# Patient Record
Sex: FEMALE | Race: OTHER | HISPANIC OR LATINO
[De-identification: names, ages, dates, MRNs, and addresses within clinical notes are randomized per-mention and may not be internally consistent; named-entity substitution may affect disease eponyms.]

---

## 2017-08-01 ENCOUNTER — TRANSCRIPTION ENCOUNTER (OUTPATIENT)
Age: 27
End: 2017-08-01

## 2020-11-27 ENCOUNTER — APPOINTMENT (OUTPATIENT)
Dept: OBGYN | Facility: HOSPITAL | Age: 30
End: 2020-11-27

## 2020-11-27 ENCOUNTER — OUTPATIENT (OUTPATIENT)
Dept: OUTPATIENT SERVICES | Facility: HOSPITAL | Age: 30
LOS: 1 days | End: 2020-11-27

## 2020-11-27 ENCOUNTER — TRANSCRIPTION ENCOUNTER (OUTPATIENT)
Age: 30
End: 2020-11-27

## 2020-11-27 ENCOUNTER — EMERGENCY (EMERGENCY)
Facility: HOSPITAL | Age: 30
LOS: 1 days | Discharge: HOME | End: 2020-11-27
Attending: EMERGENCY MEDICINE | Admitting: EMERGENCY MEDICINE
Payer: MEDICAID

## 2020-11-27 VITALS
RESPIRATION RATE: 18 BRPM | SYSTOLIC BLOOD PRESSURE: 105 MMHG | HEART RATE: 77 BPM | OXYGEN SATURATION: 100 % | DIASTOLIC BLOOD PRESSURE: 55 MMHG

## 2020-11-27 VITALS
RESPIRATION RATE: 18 BRPM | HEART RATE: 75 BPM | DIASTOLIC BLOOD PRESSURE: 64 MMHG | TEMPERATURE: 98 F | SYSTOLIC BLOOD PRESSURE: 104 MMHG | OXYGEN SATURATION: 98 %

## 2020-11-27 DIAGNOSIS — Z32.00 ENCOUNTER FOR PREGNANCY TEST, RESULT UNKNOWN: ICD-10-CM

## 2020-11-27 PROBLEM — Z00.00 ENCOUNTER FOR PREVENTIVE HEALTH EXAMINATION: Status: ACTIVE | Noted: 2020-11-27

## 2020-11-27 LAB
ALBUMIN SERPL ELPH-MCNC: 4.5 G/DL — SIGNIFICANT CHANGE UP (ref 3.3–5)
ALP SERPL-CCNC: 62 U/L — SIGNIFICANT CHANGE UP (ref 40–120)
ALT FLD-CCNC: 42 U/L — HIGH (ref 4–33)
ANION GAP SERPL CALC-SCNC: 8 MMO/L — SIGNIFICANT CHANGE UP (ref 7–14)
APPEARANCE UR: SIGNIFICANT CHANGE UP
AST SERPL-CCNC: 19 U/L — SIGNIFICANT CHANGE UP (ref 4–32)
BACTERIA # UR AUTO: SIGNIFICANT CHANGE UP
BASOPHILS # BLD AUTO: 0.01 K/UL — SIGNIFICANT CHANGE UP (ref 0–0.2)
BASOPHILS NFR BLD AUTO: 0.2 % — SIGNIFICANT CHANGE UP (ref 0–2)
BILIRUB SERPL-MCNC: 1.1 MG/DL — SIGNIFICANT CHANGE UP (ref 0.2–1.2)
BILIRUB UR-MCNC: NEGATIVE — SIGNIFICANT CHANGE UP
BLD GP AB SCN SERPL QL: NEGATIVE — SIGNIFICANT CHANGE UP
BLOOD UR QL VISUAL: NEGATIVE — SIGNIFICANT CHANGE UP
BUN SERPL-MCNC: 12 MG/DL — SIGNIFICANT CHANGE UP (ref 7–23)
CALCIUM SERPL-MCNC: 8.9 MG/DL — SIGNIFICANT CHANGE UP (ref 8.4–10.5)
CHLORIDE SERPL-SCNC: 103 MMOL/L — SIGNIFICANT CHANGE UP (ref 98–107)
CO2 SERPL-SCNC: 23 MMOL/L — SIGNIFICANT CHANGE UP (ref 22–31)
COLOR SPEC: YELLOW — SIGNIFICANT CHANGE UP
CREAT SERPL-MCNC: 0.43 MG/DL — LOW (ref 0.5–1.3)
EOSINOPHIL # BLD AUTO: 0.03 K/UL — SIGNIFICANT CHANGE UP (ref 0–0.5)
EOSINOPHIL NFR BLD AUTO: 0.6 % — SIGNIFICANT CHANGE UP (ref 0–6)
GLUCOSE SERPL-MCNC: 84 MG/DL — SIGNIFICANT CHANGE UP (ref 70–99)
GLUCOSE UR-MCNC: NEGATIVE — SIGNIFICANT CHANGE UP
HCG SERPL-ACNC: 8089 MIU/ML — SIGNIFICANT CHANGE UP
HCG UR QL: POSITIVE
HCG UR-SCNC: POSITIVE — SIGNIFICANT CHANGE UP
HCT VFR BLD CALC: 31 % — LOW (ref 34.5–45)
HGB BLD-MCNC: 10.1 G/DL — LOW (ref 11.5–15.5)
HYALINE CASTS # UR AUTO: SIGNIFICANT CHANGE UP
IMM GRANULOCYTES NFR BLD AUTO: 0.4 % — SIGNIFICANT CHANGE UP (ref 0–1.5)
INR BLD: 1.25 — HIGH (ref 0.88–1.16)
KETONES UR-MCNC: NEGATIVE — SIGNIFICANT CHANGE UP
LEUKOCYTE ESTERASE UR-ACNC: SIGNIFICANT CHANGE UP
LYMPHOCYTES # BLD AUTO: 1.39 K/UL — SIGNIFICANT CHANGE UP (ref 1–3.3)
LYMPHOCYTES # BLD AUTO: 27.4 % — SIGNIFICANT CHANGE UP (ref 13–44)
MCHC RBC-ENTMCNC: 29.5 PG — SIGNIFICANT CHANGE UP (ref 27–34)
MCHC RBC-ENTMCNC: 32.6 % — SIGNIFICANT CHANGE UP (ref 32–36)
MCV RBC AUTO: 90.6 FL — SIGNIFICANT CHANGE UP (ref 80–100)
MONOCYTES # BLD AUTO: 0.38 K/UL — SIGNIFICANT CHANGE UP (ref 0–0.9)
MONOCYTES NFR BLD AUTO: 7.5 % — SIGNIFICANT CHANGE UP (ref 2–14)
NEUTROPHILS # BLD AUTO: 3.25 K/UL — SIGNIFICANT CHANGE UP (ref 1.8–7.4)
NEUTROPHILS NFR BLD AUTO: 63.9 % — SIGNIFICANT CHANGE UP (ref 43–77)
NITRITE UR-MCNC: NEGATIVE — SIGNIFICANT CHANGE UP
NRBC # FLD: 0 K/UL — SIGNIFICANT CHANGE UP (ref 0–0)
PH UR: 8 — SIGNIFICANT CHANGE UP (ref 5–8)
PLATELET # BLD AUTO: 194 K/UL — SIGNIFICANT CHANGE UP (ref 150–400)
PMV BLD: 10.2 FL — SIGNIFICANT CHANGE UP (ref 7–13)
POTASSIUM SERPL-MCNC: 3.9 MMOL/L — SIGNIFICANT CHANGE UP (ref 3.5–5.3)
POTASSIUM SERPL-SCNC: 3.9 MMOL/L — SIGNIFICANT CHANGE UP (ref 3.5–5.3)
PROT SERPL-MCNC: 7.1 G/DL — SIGNIFICANT CHANGE UP (ref 6–8.3)
PROT UR-MCNC: 20 — SIGNIFICANT CHANGE UP
PROTHROM AB SERPL-ACNC: 14.2 SEC — HIGH (ref 10.6–13.6)
QUALITY CONTROL: YES
RBC # BLD: 3.42 M/UL — LOW (ref 3.8–5.2)
RBC # FLD: 12.3 % — SIGNIFICANT CHANGE UP (ref 10.3–14.5)
RBC CASTS # UR COMP ASSIST: SIGNIFICANT CHANGE UP (ref 0–?)
RH IG SCN BLD-IMP: POSITIVE — SIGNIFICANT CHANGE UP
SODIUM SERPL-SCNC: 134 MMOL/L — LOW (ref 135–145)
SP GR SPEC: 1.03 — SIGNIFICANT CHANGE UP (ref 1–1.04)
SP GR UR: SIGNIFICANT CHANGE UP (ref 1–1.04)
SQUAMOUS # UR AUTO: SIGNIFICANT CHANGE UP
UROBILINOGEN FLD QL: NORMAL — SIGNIFICANT CHANGE UP
WBC # BLD: 5.08 K/UL — SIGNIFICANT CHANGE UP (ref 3.8–10.5)
WBC # FLD AUTO: 5.08 K/UL — SIGNIFICANT CHANGE UP (ref 3.8–10.5)
WBC UR QL: HIGH (ref 0–?)

## 2020-11-27 PROCEDURE — 76830 TRANSVAGINAL US NON-OB: CPT | Mod: 26

## 2020-11-27 PROCEDURE — 99284 EMERGENCY DEPT VISIT MOD MDM: CPT

## 2020-11-27 RX ORDER — CEPHALEXIN 500 MG
1 CAPSULE ORAL
Qty: 13 | Refills: 0
Start: 2020-11-27 | End: 2020-12-03

## 2020-11-27 RX ORDER — CEPHALEXIN 500 MG
500 CAPSULE ORAL ONCE
Refills: 0 | Status: COMPLETED | OUTPATIENT
Start: 2020-11-27 | End: 2020-11-27

## 2020-11-27 RX ADMIN — Medication 500 MILLIGRAM(S): at 16:17

## 2020-11-27 NOTE — ED PROVIDER NOTE - OBJECTIVE STATEMENT
31 y/o F with no significant PMHx currently pregnant based on home pregnancy test presents to the ED c/o 3 days pelvic cramping and spotting. Went to GYN clinic and tested positive on a pregnancy test and was advised to come to the ED for a further evaluation. Pt states she tested positive for COVID on  and tested negative for COVID on . Pt currently denies any COVID symptoms. LMP 10/17 and pt is . Denies fever, chills, chest pain, SOB, cough, weakness, dizziness, nausea, vomiting, dysuria, urine frequency. 29 y/o Female with no significant PMHx currently pregnant based on home pregnancy test presents to the ER c/o 3 days of pelvic cramping and spotting. Pt went to the GYN clinic at [facility] and had a positive pregnancy test and was advised to go to the ED for a further evaluation. Pt states she tested positive for COVID on  and tested negative for COVID on . Pt currently denies any COVID symptoms. LMP 10/17 and pt is . Denies fever, chills, chest pain, SOB, cough, weakness, dizziness, nausea, vomiting, dysuria, urine frequency.

## 2020-11-27 NOTE — ED PROVIDER NOTE - ATTENDING CONTRIBUTION TO CARE
I performed a face to face evaluation of this patient and obtained a history and performed a full exam.  I agree with the history, physical exam and plan of the PA.    Brief HPI:  31 yo  F, LMP 10/17 presents for vaginal bleeding.  No confirmed iup for this pregnancy.  Reports cramping lower abdominal pain.     Vitals:   Reviewed    Exam:    GEN:  Non-toxic appearing, non-distressed, speaking full sentences, non-diaphoretic, AAOx3  HEENT:  NCAT, neck supple, EOMI, PERRLA, sclera anicteric, no conjunctival pallor or injection, no stridor, normal voice, no tonsillar exudate, uvula midline, tympanic membranes and external auditory canals normal appearing bilaterally   CV:  regular rhythm and rate, s1/s2 audible, no murmurs, rubs or gallops, peripheral pulses 2+ and symmetric  PULM:  non-labored respirations, lungs clear to auscultation bilaterally, no wheezes, crackles or rales  ABD:  non distended, non-tender, no rebound, no guarding, negative Jolley's sign, bowel sounds normal, no cvat  MSK:  no gross deformity, non-tender extremities and joints, range of motion grossly normal appearing, no extremity edema, extremities warm and well perfused   NEURO:  AAOx3, CN II-XII intact, motor 5/5 in upper and lower extremities bilaterally, sensation grossly intact in extremities and trunk, finger to nose testing wnl, no nystagmus, negative Romberg, no pronator drift, no gait deficit  SKIN:  warm, dry, no rash or vesicles   GYN:  See PA note     A/P:  31 yo  F, LMP 10/17 presents for vaginal bleeding and lower abdominal pain.  VSS.  DDx includes ectopic vs. threatened vs. spontaneous .  Labs, tvus, supportive care.  Dispo pending.

## 2020-11-27 NOTE — ED PROVIDER NOTE - PROGRESS NOTE DETAILS
YEE Pritchard: pt feels better ambulating without difficulty.  Results reviewed with patient.  Discharge reviewed and discussed with patient.

## 2020-11-27 NOTE — ED ADULT NURSE NOTE - OBJECTIVE STATEMENT
Pt awake, alert and oriented x 4 c/o vaginal bleeding x 3 days with positive UCG at home today.   LMP 10/17.   Pt tested positive for covid on 11/5 started on abx and tested negative 11/17.   Denies pelvic/abdominal pain or pain/burning with urination.  No bleeding from elsewhere.   IV placed and blood work sent.

## 2020-11-27 NOTE — ED PROVIDER NOTE - PATIENT PORTAL LINK FT
You can access the FollowMyHealth Patient Portal offered by [facility] by registering at the following website: [email or web address]. By joining [employer]’s FollowMyHealth portal, you will also be able to view your health information using other applications (apps) compatible with our system.

## 2020-11-27 NOTE — ED PROVIDER NOTE - NSFOLLOWUPINSTRUCTIONS_ED_ALL_ED_FT
Follow up with your Primary Medical Doctor in 1-2 days.  Follow up with OB/GYN in 1-2 days see attached list.  Rest,  Drink plenty of fluids.  Take Keflex 500mg orally 2 x a day x 7 days.  Return to the ER for any persistent/worsening or new symptoms pain, increased bleeding, weakness, dizziness, or any concerning symptoms.

## 2020-11-27 NOTE — ED PROVIDER NOTE - CLINICAL SUMMARY MEDICAL DECISION MAKING FREE TEXT BOX
31 y/o F with no significant PMHx currently pregnant based on home pregnancy test presents to the ED c/o 3 days pelvic cramping and spotting. Pt is well appearing and in no acute distress. Will check labs, US to further eval pregnancy and pt to f/u OBGYN. 29 y/o Female with no significant PMHx currently pregnant based on home pregnancy test presents to the ER c/o 3 days pelvic cramping and spotting. Pt is well appearing and in no acute distress. Will check labs, US to further eval pregnancy and f/u OBGYN.

## 2020-11-28 LAB
CULTURE RESULTS: SIGNIFICANT CHANGE UP
SPECIMEN SOURCE: SIGNIFICANT CHANGE UP

## 2020-11-30 DIAGNOSIS — Z32.00 ENCOUNTER FOR PREGNANCY TEST, RESULT UNKNOWN: ICD-10-CM

## 2020-12-22 ENCOUNTER — RESULT REVIEW (OUTPATIENT)
Age: 30
End: 2020-12-22

## 2020-12-22 ENCOUNTER — NON-APPOINTMENT (OUTPATIENT)
Age: 30
End: 2020-12-22

## 2020-12-22 ENCOUNTER — OUTPATIENT (OUTPATIENT)
Dept: OUTPATIENT SERVICES | Facility: HOSPITAL | Age: 30
LOS: 1 days | End: 2020-12-22

## 2020-12-22 ENCOUNTER — APPOINTMENT (OUTPATIENT)
Dept: OBGYN | Facility: HOSPITAL | Age: 30
End: 2020-12-22

## 2020-12-22 VITALS
HEIGHT: 67 IN | TEMPERATURE: 97 F | WEIGHT: 164 LBS | SYSTOLIC BLOOD PRESSURE: 110 MMHG | DIASTOLIC BLOOD PRESSURE: 50 MMHG | BODY MASS INDEX: 25.74 KG/M2 | HEART RATE: 76 BPM

## 2020-12-22 DIAGNOSIS — Z00.00 ENCOUNTER FOR GENERAL ADULT MEDICAL EXAMINATION WITHOUT ABNORMAL FINDINGS: ICD-10-CM

## 2020-12-22 DIAGNOSIS — O99.611 DISEASES OF THE DIGESTIVE SYSTEM COMPLICATING PREGNANCY, FIRST TRIMESTER: ICD-10-CM

## 2020-12-22 DIAGNOSIS — Z34.91 ENCOUNTER FOR SUPERVISION OF NORMAL PREGNANCY, UNSPECIFIED, FIRST TRIMESTER: ICD-10-CM

## 2020-12-22 DIAGNOSIS — Z34.90 ENCOUNTER FOR SUPERVISION OF NORMAL PREGNANCY, UNSPECIFIED, UNSPECIFIED TRIMESTER: ICD-10-CM

## 2020-12-22 DIAGNOSIS — Z80.0 FAMILY HISTORY OF MALIGNANT NEOPLASM OF DIGESTIVE ORGANS: ICD-10-CM

## 2020-12-22 DIAGNOSIS — Z23 ENCOUNTER FOR IMMUNIZATION: ICD-10-CM

## 2020-12-22 PROBLEM — Z78.9 OTHER SPECIFIED HEALTH STATUS: Chronic | Status: ACTIVE | Noted: 2020-12-02

## 2020-12-22 LAB
BASOPHILS # BLD AUTO: 0.02 K/UL — SIGNIFICANT CHANGE UP (ref 0–0.2)
BASOPHILS NFR BLD AUTO: 0.4 % — SIGNIFICANT CHANGE UP (ref 0–2)
EOSINOPHIL # BLD AUTO: 0.06 K/UL — SIGNIFICANT CHANGE UP (ref 0–0.5)
EOSINOPHIL NFR BLD AUTO: 1.2 % — SIGNIFICANT CHANGE UP (ref 0–6)
HCT VFR BLD CALC: 35.6 % — SIGNIFICANT CHANGE UP (ref 34.5–45)
HGB BLD-MCNC: 11.8 G/DL — SIGNIFICANT CHANGE UP (ref 11.5–15.5)
IANC: 3.4 K/UL — SIGNIFICANT CHANGE UP (ref 1.5–8.5)
IMM GRANULOCYTES NFR BLD AUTO: 0.2 % — SIGNIFICANT CHANGE UP (ref 0–1.5)
LYMPHOCYTES # BLD AUTO: 1.13 K/UL — SIGNIFICANT CHANGE UP (ref 1–3.3)
LYMPHOCYTES # BLD AUTO: 23.3 % — SIGNIFICANT CHANGE UP (ref 13–44)
MCHC RBC-ENTMCNC: 31.2 PG — SIGNIFICANT CHANGE UP (ref 27–34)
MCHC RBC-ENTMCNC: 33.1 GM/DL — SIGNIFICANT CHANGE UP (ref 32–36)
MCV RBC AUTO: 94.2 FL — SIGNIFICANT CHANGE UP (ref 80–100)
MONOCYTES # BLD AUTO: 0.23 K/UL — SIGNIFICANT CHANGE UP (ref 0–0.9)
MONOCYTES NFR BLD AUTO: 4.7 % — SIGNIFICANT CHANGE UP (ref 2–14)
NEUTROPHILS # BLD AUTO: 3.4 K/UL — SIGNIFICANT CHANGE UP (ref 1.8–7.4)
NEUTROPHILS NFR BLD AUTO: 70.2 % — SIGNIFICANT CHANGE UP (ref 43–77)
NRBC # BLD: 0 /100 WBCS — SIGNIFICANT CHANGE UP
NRBC # FLD: 0 K/UL — SIGNIFICANT CHANGE UP
PLATELET # BLD AUTO: 193 K/UL — SIGNIFICANT CHANGE UP (ref 150–400)
RBC # BLD: 3.78 M/UL — LOW (ref 3.8–5.2)
RBC # FLD: 13.7 % — SIGNIFICANT CHANGE UP (ref 10.3–14.5)
WBC # BLD: 4.85 K/UL — SIGNIFICANT CHANGE UP (ref 3.8–10.5)
WBC # FLD AUTO: 4.85 K/UL — SIGNIFICANT CHANGE UP (ref 3.8–10.5)

## 2020-12-23 LAB
24R-OH-CALCIDIOL SERPL-MCNC: 14.6 NG/ML — LOW (ref 30–80)
A1C WITH ESTIMATED AVERAGE GLUCOSE RESULT: 4.5 % — SIGNIFICANT CHANGE UP (ref 4–5.6)
ALBUMIN SERPL ELPH-MCNC: 4.6 G/DL — SIGNIFICANT CHANGE UP (ref 3.3–5)
ALP SERPL-CCNC: 57 U/L — SIGNIFICANT CHANGE UP (ref 40–120)
ALT FLD-CCNC: 14 U/L — SIGNIFICANT CHANGE UP (ref 4–33)
ANION GAP SERPL CALC-SCNC: 13 MMOL/L — SIGNIFICANT CHANGE UP (ref 7–14)
APPEARANCE UR: ABNORMAL
AST SERPL-CCNC: 13 U/L — SIGNIFICANT CHANGE UP (ref 4–32)
BACTERIA # UR AUTO: NEGATIVE — SIGNIFICANT CHANGE UP
BILIRUB SERPL-MCNC: 1.2 MG/DL — SIGNIFICANT CHANGE UP (ref 0.2–1.2)
BILIRUB UR-MCNC: NEGATIVE — SIGNIFICANT CHANGE UP
BUN SERPL-MCNC: 10 MG/DL — SIGNIFICANT CHANGE UP (ref 7–23)
CALCIUM SERPL-MCNC: 9.3 MG/DL — SIGNIFICANT CHANGE UP (ref 8.4–10.5)
CHLORIDE SERPL-SCNC: 101 MMOL/L — SIGNIFICANT CHANGE UP (ref 98–107)
CO2 SERPL-SCNC: 23 MMOL/L — SIGNIFICANT CHANGE UP (ref 22–31)
COLOR SPEC: YELLOW — SIGNIFICANT CHANGE UP
CREAT SERPL-MCNC: 0.43 MG/DL — LOW (ref 0.5–1.3)
CULTURE RESULTS: SIGNIFICANT CHANGE UP
DIFF PNL FLD: ABNORMAL
EPI CELLS # UR: SIGNIFICANT CHANGE UP
ESTIMATED AVERAGE GLUCOSE: 82 MG/DL — SIGNIFICANT CHANGE UP (ref 68–114)
GLUCOSE SERPL-MCNC: 37 MG/DL — CRITICAL LOW (ref 70–99)
GLUCOSE UR QL: NEGATIVE — SIGNIFICANT CHANGE UP
HBV SURFACE AG SER-ACNC: SIGNIFICANT CHANGE UP
HCV AB S/CO SERPL IA: 0.12 S/CO — SIGNIFICANT CHANGE UP (ref 0–0.99)
HCV AB SERPL-IMP: SIGNIFICANT CHANGE UP
HEMOGLOBIN INTERPRETATION: SIGNIFICANT CHANGE UP
HGB A MFR BLD: 96.4 % — SIGNIFICANT CHANGE UP
HGB A2 MFR BLD: 2.9 % — SIGNIFICANT CHANGE UP (ref 2.4–3.5)
HGB F MFR BLD: <1 % — SIGNIFICANT CHANGE UP (ref 0–1.5)
HIV 1+2 AB+HIV1 P24 AG SERPL QL IA: SIGNIFICANT CHANGE UP
HYALINE CASTS # UR AUTO: SIGNIFICANT CHANGE UP /LPF (ref 0–7)
KETONES UR-MCNC: NEGATIVE — SIGNIFICANT CHANGE UP
LEUKOCYTE ESTERASE UR-ACNC: NEGATIVE — SIGNIFICANT CHANGE UP
MEV IGG SER-ACNC: 17 AU/ML — SIGNIFICANT CHANGE UP
MEV IGG+IGM SER-IMP: POSITIVE — SIGNIFICANT CHANGE UP
NITRITE UR-MCNC: NEGATIVE — SIGNIFICANT CHANGE UP
PH UR: 7 — SIGNIFICANT CHANGE UP (ref 5–8)
POTASSIUM SERPL-MCNC: 3.1 MMOL/L — LOW (ref 3.5–5.3)
POTASSIUM SERPL-SCNC: 3.1 MMOL/L — LOW (ref 3.5–5.3)
PROT SERPL-MCNC: 7.8 G/DL — SIGNIFICANT CHANGE UP (ref 6–8.3)
PROT UR-MCNC: ABNORMAL
RBC CASTS # UR COMP ASSIST: SIGNIFICANT CHANGE UP /HPF (ref 0–4)
RUBV IGG SER-ACNC: 2.5 INDEX — SIGNIFICANT CHANGE UP
RUBV IGG SER-IMP: POSITIVE — SIGNIFICANT CHANGE UP
SODIUM SERPL-SCNC: 137 MMOL/L — SIGNIFICANT CHANGE UP (ref 135–145)
SP GR SPEC: 1.03 — HIGH (ref 1.01–1.02)
SPECIMEN SOURCE: SIGNIFICANT CHANGE UP
T PALLIDUM AB TITR SER: NEGATIVE — SIGNIFICANT CHANGE UP
URATE SERPL-MCNC: 1.6 MG/DL — LOW (ref 2.5–7)
UROBILINOGEN FLD QL: ABNORMAL
VZV IGG FLD QL IA: 2665 INDEX — SIGNIFICANT CHANGE UP
VZV IGG FLD QL IA: POSITIVE — SIGNIFICANT CHANGE UP
WBC UR QL: SIGNIFICANT CHANGE UP /HPF (ref 0–5)

## 2020-12-24 LAB
CYTOLOGY SPEC DOC CYTO: SIGNIFICANT CHANGE UP
GAMMA INTERFERON BACKGROUND BLD IA-ACNC: 0.06 IU/ML — SIGNIFICANT CHANGE UP
LEAD BLD-MCNC: <1 UG/DL — SIGNIFICANT CHANGE UP (ref 0–4)
M TB IFN-G BLD-IMP: NEGATIVE — SIGNIFICANT CHANGE UP
M TB IFN-G CD4+ BCKGRND COR BLD-ACNC: 0.04 IU/ML — SIGNIFICANT CHANGE UP
M TB IFN-G CD4+CD8+ BCKGRND COR BLD-ACNC: 0.15 IU/ML — SIGNIFICANT CHANGE UP
QUANT TB PLUS MITOGEN MINUS NIL: >10 IU/ML — SIGNIFICANT CHANGE UP
SARS-COV-2 IGG SERPL QL IA: POSITIVE
SARS-COV-2 IGM SERPL IA-ACNC: 17 INDEX — HIGH

## 2020-12-28 ENCOUNTER — NON-APPOINTMENT (OUTPATIENT)
Age: 30
End: 2020-12-28

## 2021-01-03 LAB — CYSTIC FIBROSIS EXPANDED PANEL: SIGNIFICANT CHANGE UP

## 2021-01-11 ENCOUNTER — NON-APPOINTMENT (OUTPATIENT)
Age: 31
End: 2021-01-11

## 2021-01-12 ENCOUNTER — OUTPATIENT (OUTPATIENT)
Dept: OUTPATIENT SERVICES | Facility: HOSPITAL | Age: 31
LOS: 1 days | End: 2021-01-12

## 2021-01-12 ENCOUNTER — RESULT REVIEW (OUTPATIENT)
Age: 31
End: 2021-01-12

## 2021-01-12 ENCOUNTER — NON-APPOINTMENT (OUTPATIENT)
Age: 31
End: 2021-01-12

## 2021-01-12 ENCOUNTER — APPOINTMENT (OUTPATIENT)
Dept: OBGYN | Facility: HOSPITAL | Age: 31
End: 2021-01-12

## 2021-01-12 VITALS
BODY MASS INDEX: 25.53 KG/M2 | SYSTOLIC BLOOD PRESSURE: 98 MMHG | WEIGHT: 163 LBS | DIASTOLIC BLOOD PRESSURE: 57 MMHG | HEART RATE: 75 BPM | TEMPERATURE: 97.7 F

## 2021-01-12 LAB
ALBUMIN SERPL ELPH-MCNC: 4.3 G/DL — SIGNIFICANT CHANGE UP (ref 3.3–5)
ALP SERPL-CCNC: 52 U/L — SIGNIFICANT CHANGE UP (ref 40–120)
ALT FLD-CCNC: 15 U/L — SIGNIFICANT CHANGE UP (ref 4–33)
ANION GAP SERPL CALC-SCNC: 11 MMOL/L — SIGNIFICANT CHANGE UP (ref 7–14)
AST SERPL-CCNC: 15 U/L — SIGNIFICANT CHANGE UP (ref 4–32)
BILIRUB SERPL-MCNC: 0.8 MG/DL — SIGNIFICANT CHANGE UP (ref 0.2–1.2)
BUN SERPL-MCNC: 9 MG/DL — SIGNIFICANT CHANGE UP (ref 7–23)
CALCIUM SERPL-MCNC: 9.1 MG/DL — SIGNIFICANT CHANGE UP (ref 8.4–10.5)
CHLORIDE SERPL-SCNC: 102 MMOL/L — SIGNIFICANT CHANGE UP (ref 98–107)
CO2 SERPL-SCNC: 26 MMOL/L — SIGNIFICANT CHANGE UP (ref 22–31)
CREAT SERPL-MCNC: 0.49 MG/DL — LOW (ref 0.5–1.3)
GLUCOSE BLDC GLUCOMTR-MCNC: 104
GLUCOSE SERPL-MCNC: 22 MG/DL — CRITICAL LOW (ref 70–99)
POTASSIUM SERPL-MCNC: 3 MMOL/L — LOW (ref 3.5–5.3)
POTASSIUM SERPL-SCNC: 3 MMOL/L — LOW (ref 3.5–5.3)
PROT SERPL-MCNC: 7.5 G/DL — SIGNIFICANT CHANGE UP (ref 6–8.3)
SODIUM SERPL-SCNC: 139 MMOL/L — SIGNIFICANT CHANGE UP (ref 135–145)

## 2021-01-13 ENCOUNTER — NON-APPOINTMENT (OUTPATIENT)
Age: 31
End: 2021-01-13

## 2021-01-13 DIAGNOSIS — O99.611 DISEASES OF THE DIGESTIVE SYSTEM COMPLICATING PREGNANCY, FIRST TRIMESTER: ICD-10-CM

## 2021-01-13 DIAGNOSIS — Z34.91 ENCOUNTER FOR SUPERVISION OF NORMAL PREGNANCY, UNSPECIFIED, FIRST TRIMESTER: ICD-10-CM

## 2021-01-13 DIAGNOSIS — R79.89 OTHER SPECIFIED ABNORMAL FINDINGS OF BLOOD CHEMISTRY: ICD-10-CM

## 2021-01-13 DIAGNOSIS — Z00.00 ENCOUNTER FOR GENERAL ADULT MEDICAL EXAMINATION WITHOUT ABNORMAL FINDINGS: ICD-10-CM

## 2021-01-13 DIAGNOSIS — O41.8X90 OTHER SPECIFIED DISORDERS OF AMNIOTIC FLUID AND MEMBRANES, UNSPECIFIED TRIMESTER, NOT APPLICABLE OR UNSPECIFIED: ICD-10-CM

## 2021-01-13 LAB
C TRACH RRNA SPEC QL NAA+PROBE: SIGNIFICANT CHANGE UP
N GONORRHOEA RRNA SPEC QL NAA+PROBE: SIGNIFICANT CHANGE UP
SPECIMEN SOURCE: SIGNIFICANT CHANGE UP

## 2021-01-14 ENCOUNTER — RESULT REVIEW (OUTPATIENT)
Age: 31
End: 2021-01-14

## 2021-01-14 ENCOUNTER — ASOB RESULT (OUTPATIENT)
Age: 31
End: 2021-01-14

## 2021-01-14 ENCOUNTER — APPOINTMENT (OUTPATIENT)
Dept: ANTEPARTUM | Facility: CLINIC | Age: 31
End: 2021-01-14
Payer: MEDICAID

## 2021-01-14 PROCEDURE — 76801 OB US < 14 WKS SINGLE FETUS: CPT | Mod: 26

## 2021-01-14 PROCEDURE — 36416 COLLJ CAPILLARY BLOOD SPEC: CPT

## 2021-01-14 PROCEDURE — 76813 OB US NUCHAL MEAS 1 GEST: CPT | Mod: 26

## 2021-01-18 LAB
1ST TRIMESTER DATA: SIGNIFICANT CHANGE UP
ADDENDUM DOC: SIGNIFICANT CHANGE UP
AFP SERPL-ACNC: SIGNIFICANT CHANGE UP
B-HCG FREE SERPL-MCNC: SIGNIFICANT CHANGE UP
CLINICAL BIOCHEMIST REVIEW: SIGNIFICANT CHANGE UP
CLINICAL BIOCHEMIST REVIEW: SIGNIFICANT CHANGE UP
DEMOGRAPHIC DATA: SIGNIFICANT CHANGE UP
NT: SIGNIFICANT CHANGE UP
PAPP-A SERPL-ACNC: SIGNIFICANT CHANGE UP
SCREEN-FOOTER: SIGNIFICANT CHANGE UP
SCREEN-RECOMMENDATIONS: SIGNIFICANT CHANGE UP

## 2021-01-20 ENCOUNTER — NON-APPOINTMENT (OUTPATIENT)
Age: 31
End: 2021-01-20

## 2021-01-21 ENCOUNTER — NON-APPOINTMENT (OUTPATIENT)
Age: 31
End: 2021-01-21

## 2021-01-21 ENCOUNTER — OUTPATIENT (OUTPATIENT)
Dept: OUTPATIENT SERVICES | Facility: HOSPITAL | Age: 31
LOS: 1 days | End: 2021-01-21

## 2021-01-21 ENCOUNTER — APPOINTMENT (OUTPATIENT)
Dept: OBGYN | Facility: HOSPITAL | Age: 31
End: 2021-01-21

## 2021-01-21 ENCOUNTER — RESULT REVIEW (OUTPATIENT)
Age: 31
End: 2021-01-21

## 2021-01-21 VITALS
SYSTOLIC BLOOD PRESSURE: 116 MMHG | WEIGHT: 166 LBS | HEIGHT: 67 IN | BODY MASS INDEX: 26.06 KG/M2 | TEMPERATURE: 98.4 F | DIASTOLIC BLOOD PRESSURE: 53 MMHG | HEART RATE: 77 BPM

## 2021-01-21 LAB
ALBUMIN SERPL ELPH-MCNC: 4.1 G/DL — SIGNIFICANT CHANGE UP (ref 3.3–5)
ALP SERPL-CCNC: 51 U/L — SIGNIFICANT CHANGE UP (ref 40–120)
ALT FLD-CCNC: 11 U/L — SIGNIFICANT CHANGE UP (ref 4–33)
ANION GAP SERPL CALC-SCNC: 10 MMOL/L — SIGNIFICANT CHANGE UP (ref 7–14)
AST SERPL-CCNC: 15 U/L — SIGNIFICANT CHANGE UP (ref 4–32)
BILIRUB SERPL-MCNC: 0.7 MG/DL — SIGNIFICANT CHANGE UP (ref 0.2–1.2)
BUN SERPL-MCNC: 8 MG/DL — SIGNIFICANT CHANGE UP (ref 7–23)
CALCIUM SERPL-MCNC: 9.1 MG/DL — SIGNIFICANT CHANGE UP (ref 8.4–10.5)
CHLORIDE SERPL-SCNC: 102 MMOL/L — SIGNIFICANT CHANGE UP (ref 98–107)
CO2 SERPL-SCNC: 24 MMOL/L — SIGNIFICANT CHANGE UP (ref 22–31)
CREAT SERPL-MCNC: 0.41 MG/DL — LOW (ref 0.5–1.3)
GLUCOSE SERPL-MCNC: 58 MG/DL — LOW (ref 70–99)
POTASSIUM SERPL-MCNC: 3.5 MMOL/L — SIGNIFICANT CHANGE UP (ref 3.5–5.3)
POTASSIUM SERPL-SCNC: 3.5 MMOL/L — SIGNIFICANT CHANGE UP (ref 3.5–5.3)
PROT SERPL-MCNC: 6.9 G/DL — SIGNIFICANT CHANGE UP (ref 6–8.3)
SODIUM SERPL-SCNC: 136 MMOL/L — SIGNIFICANT CHANGE UP (ref 135–145)

## 2021-01-22 ENCOUNTER — EMERGENCY (EMERGENCY)
Facility: HOSPITAL | Age: 31
LOS: 1 days | Discharge: HOME | End: 2021-01-22
Attending: HOSPITALIST | Admitting: HOSPITALIST
Payer: MEDICAID

## 2021-01-22 VITALS
HEART RATE: 77 BPM | DIASTOLIC BLOOD PRESSURE: 45 MMHG | RESPIRATION RATE: 20 BRPM | OXYGEN SATURATION: 100 % | SYSTOLIC BLOOD PRESSURE: 108 MMHG | TEMPERATURE: 98 F

## 2021-01-22 LAB
ALBUMIN SERPL ELPH-MCNC: 4.2 G/DL — SIGNIFICANT CHANGE UP (ref 3.3–5)
ALP SERPL-CCNC: 59 U/L — SIGNIFICANT CHANGE UP (ref 40–120)
ALT FLD-CCNC: 15 U/L — SIGNIFICANT CHANGE UP (ref 4–33)
ANION GAP SERPL CALC-SCNC: 11 MMOL/L — SIGNIFICANT CHANGE UP (ref 7–14)
AST SERPL-CCNC: 17 U/L — SIGNIFICANT CHANGE UP (ref 4–32)
BASOPHILS # BLD AUTO: 0.02 K/UL — SIGNIFICANT CHANGE UP (ref 0–0.2)
BASOPHILS NFR BLD AUTO: 0.2 % — SIGNIFICANT CHANGE UP (ref 0–2)
BILIRUB SERPL-MCNC: 0.7 MG/DL — SIGNIFICANT CHANGE UP (ref 0.2–1.2)
BLD GP AB SCN SERPL QL: NEGATIVE — SIGNIFICANT CHANGE UP
BUN SERPL-MCNC: 9 MG/DL — SIGNIFICANT CHANGE UP (ref 7–23)
CALCIUM SERPL-MCNC: 8.8 MG/DL — SIGNIFICANT CHANGE UP (ref 8.4–10.5)
CHLORIDE SERPL-SCNC: 102 MMOL/L — SIGNIFICANT CHANGE UP (ref 98–107)
CO2 SERPL-SCNC: 21 MMOL/L — LOW (ref 22–31)
CREAT SERPL-MCNC: 0.45 MG/DL — LOW (ref 0.5–1.3)
EOSINOPHIL # BLD AUTO: 0.13 K/UL — SIGNIFICANT CHANGE UP (ref 0–0.5)
EOSINOPHIL NFR BLD AUTO: 1.5 % — SIGNIFICANT CHANGE UP (ref 0–6)
GLUCOSE SERPL-MCNC: 81 MG/DL — SIGNIFICANT CHANGE UP (ref 70–99)
HCG SERPL-ACNC: SIGNIFICANT CHANGE UP MIU/ML
HCT VFR BLD CALC: 34 % — LOW (ref 34.5–45)
HGB BLD-MCNC: 11.9 G/DL — SIGNIFICANT CHANGE UP (ref 11.5–15.5)
IANC: 6.6 K/UL — SIGNIFICANT CHANGE UP (ref 1.5–8.5)
IMM GRANULOCYTES NFR BLD AUTO: 0.4 % — SIGNIFICANT CHANGE UP (ref 0–1.5)
LYMPHOCYTES # BLD AUTO: 1.77 K/UL — SIGNIFICANT CHANGE UP (ref 1–3.3)
LYMPHOCYTES # BLD AUTO: 19.8 % — SIGNIFICANT CHANGE UP (ref 13–44)
MCHC RBC-ENTMCNC: 30.6 PG — SIGNIFICANT CHANGE UP (ref 27–34)
MCHC RBC-ENTMCNC: 35 GM/DL — SIGNIFICANT CHANGE UP (ref 32–36)
MCV RBC AUTO: 87.4 FL — SIGNIFICANT CHANGE UP (ref 80–100)
MONOCYTES # BLD AUTO: 0.4 K/UL — SIGNIFICANT CHANGE UP (ref 0–0.9)
MONOCYTES NFR BLD AUTO: 4.5 % — SIGNIFICANT CHANGE UP (ref 2–14)
NEUTROPHILS # BLD AUTO: 6.6 K/UL — SIGNIFICANT CHANGE UP (ref 1.8–7.4)
NEUTROPHILS NFR BLD AUTO: 73.6 % — SIGNIFICANT CHANGE UP (ref 43–77)
NRBC # BLD: 0 /100 WBCS — SIGNIFICANT CHANGE UP
NRBC # FLD: 0 K/UL — SIGNIFICANT CHANGE UP
PLATELET # BLD AUTO: 207 K/UL — SIGNIFICANT CHANGE UP (ref 150–400)
POTASSIUM SERPL-MCNC: 3.8 MMOL/L — SIGNIFICANT CHANGE UP (ref 3.5–5.3)
POTASSIUM SERPL-SCNC: 3.8 MMOL/L — SIGNIFICANT CHANGE UP (ref 3.5–5.3)
PROT SERPL-MCNC: 7.4 G/DL — SIGNIFICANT CHANGE UP (ref 6–8.3)
RBC # BLD: 3.89 M/UL — SIGNIFICANT CHANGE UP (ref 3.8–5.2)
RBC # FLD: 12.2 % — SIGNIFICANT CHANGE UP (ref 10.3–14.5)
RH IG SCN BLD-IMP: POSITIVE — SIGNIFICANT CHANGE UP
SODIUM SERPL-SCNC: 134 MMOL/L — LOW (ref 135–145)
WBC # BLD: 8.96 K/UL — SIGNIFICANT CHANGE UP (ref 3.8–10.5)
WBC # FLD AUTO: 8.96 K/UL — SIGNIFICANT CHANGE UP (ref 3.8–10.5)

## 2021-01-22 PROCEDURE — 99284 EMERGENCY DEPT VISIT MOD MDM: CPT

## 2021-01-22 PROCEDURE — 76801 OB US < 14 WKS SINGLE FETUS: CPT | Mod: 26

## 2021-01-22 NOTE — ED PROVIDER NOTE - PHYSICAL EXAMINATION
PE:   GEN: Awake, alert, interactive, NAD, non-toxic appearing.   HEAD AND NECK: NC/AT. Airway patent. Neck supple.   EYES: Clear b/l. PERRL  CARDIAC: RRR. S1, S2. No evident pedal edema.    RESP: Normal respiratory effort with no use of accessory muscles or retractions. Clear throughout on auscultation.  ABD: soft, non-distended, non-tender. No rebound, no guarding.   NEURO: AOx3, CN II-XII grossly intact, no focal deficits.   MSK: Moving all extremities with no apparent deformities.   SKIN: Warm, dry, intact normal color

## 2021-01-22 NOTE — ED PROVIDER NOTE - OBJECTIVE STATEMENT
31yo female  EGA 13 weeks otherwise healthy prsents with intermittent vaginal bleeding. States she feels a "gush" and will see bright red blood on her underwear but then itll stop. Had NT scan last week that saw subchoronic hematoma but healthy fetus and was told this was source of her bleeding. Today she states she had multiple episodes (which is not the norm) and a little heavier. Denies clots or tissue passing. Denies abd pain, back pain, fevers and other associated sx.

## 2021-01-22 NOTE — ED PROVIDER NOTE - ATTENDING CONTRIBUTION TO CARE
30F  at about 13 weeks pregnant p/w vaginal bleeding x2 episodes today. patient states she was dx with a subchorionic hematoma last week but hasn't had bleeding since. today had a gush of blood twice, came to ED for eval. no pain.    ***GEN - NAD; well appearing; A+O x3 ***HEAD - NC/AT ***EYES/NOSE - PERRL, EOMI, mucous membranes moist, no discharge ***THROAT: Oral cavity and pharynx normal. No inflammation, swelling, exudate, or lesions.  ***NECK: Neck supple, non-tender without lymphadenopathy, no masses, no thyromegaly.   ***PULMONARY - CTA b/l, symmetric breath sounds. ***CARDIAC -s1s2, RRR, no M,G,R  ***ABDOMEN - +BS, ND, NT, soft, no guarding, no rebound, no masses   ***BACK - no CVA tenderness, Normal  spine ***EXTREMITIES - symmetric pulses, 2+ dp, capillary refill < 2 seconds, no clubbing, no cyanosis, no edema ***SKIN - no rash or bruising   ***NEUROLOGIC - alert, CN 2-12 intact    MDM: 30F with first trimester bleeding. known subchorionic hematoma. will obtain labs,  rpt tvus to evaluate.

## 2021-01-22 NOTE — ED PROVIDER NOTE - NS ED ROS FT
Constitutional: (-) Fever, (-) Anorexia, (-) Generalized Malaise  Eyes: (-)Discharge, (-) Irritation,  (-) Visual changes  EARS: (-) Ear Pain, (-) Apparent hearing changes  NOSE: (-) Congestion, (-) Bloody nose  MOUTH/THROAT: (-) Vocal Changes, (-) Drooling, (-) Sore throat  NECK: (-) Lumps, (-) Stiffness, (-) Pain  CV: (-) Chest Pain, (-) Palpitations, (-) Edema   RESP:  (-) Cough, (-) SOB, (-) HU,  (-) Wheezing  GI: (-) Nausea, (-) Vomiting, (-) Abdominal Pain, (-) Diarrhea, (-) Constipation, (-) Bloody stools  : (-) Dysuria, (-) Frequency, (-) Hematuria, (-) Incontinence, (+) Vaginal bleeding   MSK: (-) Joint Pain, (-) Back Pain, (-) Deformities  SKIN: (-) Wounds, (-) Color change, (-)Rash, (-) Swelling  NEURO:(-) Headache, (-) Dizziness, (-) Numbness/Tingling,  (-)LOC

## 2021-01-22 NOTE — ED ADULT NURSE NOTE - OBJECTIVE STATEMENT
pt amb to CDU 4, A&Ox3, skin w/d/i, c/o 2 episodes of vaginal bleeding today described as "gush," approx 13 weeks preg, 3rd pregnancy, states recently dx w/ subchorionic hematoma, states changes 2 regular pads this morning, +IUP on US, SL placed, labs sent, awaiting results and US.

## 2021-01-22 NOTE — ED PROVIDER NOTE - PROGRESS NOTE DETAILS
Viable fetus. No separation of placenta. Bleedning most likely from subchoronic hematoma. No urinary symptoms and has not urinated for us. Will have her follow up with OB.

## 2021-01-22 NOTE — ED PROVIDER NOTE - CLINICAL SUMMARY MEDICAL DECISION MAKING FREE TEXT BOX
31yo female  EGA 13 weeks otherwise healthy prsents with intermittent vaginal bleeding after being told has subchorionic hematoma. Abdomen soft, non tender. No cramps, no clots or tissue passing. Will get US to see progression of subchoronic andget labs.

## 2021-01-22 NOTE — ED PROVIDER NOTE - PATIENT PORTAL LINK FT
You can access the FollowMyHealth Patient Portal offered by [facility] by registering at the following website: [email or web address]. By joining [employer]’s FollowMyHealth portal, you will also be able to view your health information using other applications (apps) compatible with our system.

## 2021-01-27 DIAGNOSIS — Z34.91 ENCOUNTER FOR SUPERVISION OF NORMAL PREGNANCY, UNSPECIFIED, FIRST TRIMESTER: ICD-10-CM

## 2021-02-17 ENCOUNTER — NON-APPOINTMENT (OUTPATIENT)
Age: 31
End: 2021-02-17

## 2021-02-25 ENCOUNTER — APPOINTMENT (OUTPATIENT)
Dept: OBGYN | Facility: HOSPITAL | Age: 31
End: 2021-02-25

## 2021-02-25 ENCOUNTER — OUTPATIENT (OUTPATIENT)
Dept: OUTPATIENT SERVICES | Facility: HOSPITAL | Age: 31
LOS: 1 days | End: 2021-02-25

## 2021-02-25 ENCOUNTER — NON-APPOINTMENT (OUTPATIENT)
Age: 31
End: 2021-02-25

## 2021-02-25 VITALS
BODY MASS INDEX: 26.63 KG/M2 | SYSTOLIC BLOOD PRESSURE: 110 MMHG | HEART RATE: 75 BPM | DIASTOLIC BLOOD PRESSURE: 55 MMHG | TEMPERATURE: 97.9 F | WEIGHT: 170 LBS

## 2021-02-25 DIAGNOSIS — L30.9 DERMATITIS, UNSPECIFIED: ICD-10-CM

## 2021-03-01 DIAGNOSIS — L30.9 DERMATITIS, UNSPECIFIED: ICD-10-CM

## 2021-03-01 DIAGNOSIS — Z34.92 ENCOUNTER FOR SUPERVISION OF NORMAL PREGNANCY, UNSPECIFIED, SECOND TRIMESTER: ICD-10-CM

## 2021-03-03 ENCOUNTER — APPOINTMENT (OUTPATIENT)
Dept: ANTEPARTUM | Facility: CLINIC | Age: 31
End: 2021-03-03
Payer: MEDICAID

## 2021-03-03 ENCOUNTER — ASOB RESULT (OUTPATIENT)
Age: 31
End: 2021-03-03

## 2021-03-03 PROCEDURE — 76817 TRANSVAGINAL US OBSTETRIC: CPT | Mod: 26

## 2021-03-03 PROCEDURE — 99214 OFFICE O/P EST MOD 30 MIN: CPT | Mod: 25

## 2021-03-03 PROCEDURE — 76811 OB US DETAILED SNGL FETUS: CPT | Mod: 26

## 2021-03-24 ENCOUNTER — NON-APPOINTMENT (OUTPATIENT)
Age: 31
End: 2021-03-24

## 2021-03-25 ENCOUNTER — NON-APPOINTMENT (OUTPATIENT)
Age: 31
End: 2021-03-25

## 2021-03-25 ENCOUNTER — OUTPATIENT (OUTPATIENT)
Dept: OUTPATIENT SERVICES | Facility: HOSPITAL | Age: 31
LOS: 1 days | End: 2021-03-25

## 2021-03-25 ENCOUNTER — APPOINTMENT (OUTPATIENT)
Dept: OBGYN | Facility: HOSPITAL | Age: 31
End: 2021-03-25

## 2021-03-25 VITALS
TEMPERATURE: 98.3 F | HEART RATE: 79 BPM | DIASTOLIC BLOOD PRESSURE: 55 MMHG | SYSTOLIC BLOOD PRESSURE: 105 MMHG | WEIGHT: 175 LBS | BODY MASS INDEX: 27.41 KG/M2

## 2021-03-29 DIAGNOSIS — Z34.92 ENCOUNTER FOR SUPERVISION OF NORMAL PREGNANCY, UNSPECIFIED, SECOND TRIMESTER: ICD-10-CM

## 2021-04-15 ENCOUNTER — ASOB RESULT (OUTPATIENT)
Age: 31
End: 2021-04-15

## 2021-04-15 ENCOUNTER — NON-APPOINTMENT (OUTPATIENT)
Age: 31
End: 2021-04-15

## 2021-04-15 ENCOUNTER — OUTPATIENT (OUTPATIENT)
Dept: OUTPATIENT SERVICES | Facility: HOSPITAL | Age: 31
LOS: 1 days | End: 2021-04-15

## 2021-04-15 ENCOUNTER — APPOINTMENT (OUTPATIENT)
Dept: OBGYN | Facility: HOSPITAL | Age: 31
End: 2021-04-15

## 2021-04-15 ENCOUNTER — APPOINTMENT (OUTPATIENT)
Dept: ANTEPARTUM | Facility: CLINIC | Age: 31
End: 2021-04-15
Payer: MEDICAID

## 2021-04-15 ENCOUNTER — APPOINTMENT (OUTPATIENT)
Dept: OBGYN | Facility: CLINIC | Age: 31
End: 2021-04-15

## 2021-04-15 VITALS
WEIGHT: 174 LBS | HEIGHT: 67 IN | BODY MASS INDEX: 27.31 KG/M2 | SYSTOLIC BLOOD PRESSURE: 101 MMHG | TEMPERATURE: 98.7 F | DIASTOLIC BLOOD PRESSURE: 54 MMHG | HEART RATE: 73 BPM

## 2021-04-15 DIAGNOSIS — O41.8X90 OTHER SPECIFIED DISORDERS OF AMNIOTIC FLUID AND MEMBRANES, UNSPECIFIED TRIMESTER, NOT APPLICABLE OR UNSPECIFIED: ICD-10-CM

## 2021-04-15 DIAGNOSIS — O99.611 DISEASES OF THE DIGESTIVE SYSTEM COMPLICATING PREGNANCY, FIRST TRIMESTER: ICD-10-CM

## 2021-04-15 DIAGNOSIS — K59.00 DISEASES OF THE DIGESTIVE SYSTEM COMPLICATING PREGNANCY, FIRST TRIMESTER: ICD-10-CM

## 2021-04-15 DIAGNOSIS — Z34.91 ENCOUNTER FOR SUPERVISION OF NORMAL PREGNANCY, UNSPECIFIED, FIRST TRIMESTER: ICD-10-CM

## 2021-04-15 DIAGNOSIS — O46.8X9 OTHER SPECIFIED DISORDERS OF AMNIOTIC FLUID AND MEMBRANES, UNSPECIFIED TRIMESTER, NOT APPLICABLE OR UNSPECIFIED: ICD-10-CM

## 2021-04-15 DIAGNOSIS — Z32.00 ENCOUNTER FOR PREGNANCY TEST, RESULT UNKNOWN: ICD-10-CM

## 2021-04-15 PROCEDURE — 76816 OB US FOLLOW-UP PER FETUS: CPT | Mod: 26

## 2021-04-20 DIAGNOSIS — Z34.92 ENCOUNTER FOR SUPERVISION OF NORMAL PREGNANCY, UNSPECIFIED, SECOND TRIMESTER: ICD-10-CM

## 2021-04-20 DIAGNOSIS — O43.129 VELAMENTOUS INSERTION OF UMBILICAL CORD, UNSPECIFIED TRIMESTER: ICD-10-CM

## 2021-04-21 ENCOUNTER — NON-APPOINTMENT (OUTPATIENT)
Age: 31
End: 2021-04-21

## 2021-04-22 ENCOUNTER — OUTPATIENT (OUTPATIENT)
Dept: OUTPATIENT SERVICES | Facility: HOSPITAL | Age: 31
LOS: 1 days | End: 2021-04-22

## 2021-04-22 ENCOUNTER — RESULT REVIEW (OUTPATIENT)
Age: 31
End: 2021-04-22

## 2021-04-22 ENCOUNTER — NON-APPOINTMENT (OUTPATIENT)
Age: 31
End: 2021-04-22

## 2021-04-22 ENCOUNTER — APPOINTMENT (OUTPATIENT)
Dept: OBGYN | Facility: HOSPITAL | Age: 31
End: 2021-04-22

## 2021-04-22 VITALS
BODY MASS INDEX: 27.78 KG/M2 | TEMPERATURE: 98.3 F | DIASTOLIC BLOOD PRESSURE: 78 MMHG | HEIGHT: 67 IN | HEART RATE: 97 BPM | SYSTOLIC BLOOD PRESSURE: 122 MMHG | WEIGHT: 177 LBS

## 2021-04-22 LAB
24R-OH-CALCIDIOL SERPL-MCNC: 22.1 NG/ML — LOW (ref 30–80)
BASOPHILS # BLD AUTO: 0.01 K/UL — SIGNIFICANT CHANGE UP (ref 0–0.2)
BASOPHILS NFR BLD AUTO: 0.1 % — SIGNIFICANT CHANGE UP (ref 0–2)
EOSINOPHIL # BLD AUTO: 0.05 K/UL — SIGNIFICANT CHANGE UP (ref 0–0.5)
EOSINOPHIL NFR BLD AUTO: 0.7 % — SIGNIFICANT CHANGE UP (ref 0–6)
GLUCOSE 1H P MEAL SERPL-MCNC: 97 MG/DL — SIGNIFICANT CHANGE UP (ref 70–134)
HCT VFR BLD CALC: 30.2 % — LOW (ref 34.5–45)
HGB BLD-MCNC: 10.3 G/DL — LOW (ref 11.5–15.5)
IANC: 5.74 K/UL — SIGNIFICANT CHANGE UP (ref 1.5–8.5)
IMM GRANULOCYTES NFR BLD AUTO: 0.6 % — SIGNIFICANT CHANGE UP (ref 0–1.5)
LYMPHOCYTES # BLD AUTO: 1 K/UL — SIGNIFICANT CHANGE UP (ref 1–3.3)
LYMPHOCYTES # BLD AUTO: 13.8 % — SIGNIFICANT CHANGE UP (ref 13–44)
MCHC RBC-ENTMCNC: 30.7 PG — SIGNIFICANT CHANGE UP (ref 27–34)
MCHC RBC-ENTMCNC: 34.1 GM/DL — SIGNIFICANT CHANGE UP (ref 32–36)
MCV RBC AUTO: 89.9 FL — SIGNIFICANT CHANGE UP (ref 80–100)
MONOCYTES # BLD AUTO: 0.39 K/UL — SIGNIFICANT CHANGE UP (ref 0–0.9)
MONOCYTES NFR BLD AUTO: 5.4 % — SIGNIFICANT CHANGE UP (ref 2–14)
NEUTROPHILS # BLD AUTO: 5.74 K/UL — SIGNIFICANT CHANGE UP (ref 1.8–7.4)
NEUTROPHILS NFR BLD AUTO: 79.4 % — HIGH (ref 43–77)
NRBC # BLD: 0 /100 WBCS — SIGNIFICANT CHANGE UP
NRBC # FLD: 0 K/UL — SIGNIFICANT CHANGE UP
PLATELET # BLD AUTO: 185 K/UL — SIGNIFICANT CHANGE UP (ref 150–400)
RBC # BLD: 3.36 M/UL — LOW (ref 3.8–5.2)
RBC # FLD: 12.2 % — SIGNIFICANT CHANGE UP (ref 10.3–14.5)
T PALLIDUM AB TITR SER: NEGATIVE — SIGNIFICANT CHANGE UP
WBC # BLD: 7.23 K/UL — SIGNIFICANT CHANGE UP (ref 3.8–10.5)
WBC # FLD AUTO: 7.23 K/UL — SIGNIFICANT CHANGE UP (ref 3.8–10.5)

## 2021-04-27 DIAGNOSIS — Z34.92 ENCOUNTER FOR SUPERVISION OF NORMAL PREGNANCY, UNSPECIFIED, SECOND TRIMESTER: ICD-10-CM

## 2021-04-28 ENCOUNTER — NON-APPOINTMENT (OUTPATIENT)
Age: 31
End: 2021-04-28

## 2021-05-12 ENCOUNTER — NON-APPOINTMENT (OUTPATIENT)
Age: 31
End: 2021-05-12

## 2021-05-13 ENCOUNTER — APPOINTMENT (OUTPATIENT)
Dept: ANTEPARTUM | Facility: CLINIC | Age: 31
End: 2021-05-13

## 2021-05-14 ENCOUNTER — APPOINTMENT (OUTPATIENT)
Dept: ANTEPARTUM | Facility: CLINIC | Age: 31
End: 2021-05-14
Payer: MEDICAID

## 2021-05-14 ENCOUNTER — NON-APPOINTMENT (OUTPATIENT)
Age: 31
End: 2021-05-14

## 2021-05-14 ENCOUNTER — ASOB RESULT (OUTPATIENT)
Age: 31
End: 2021-05-14

## 2021-05-14 ENCOUNTER — MED ADMIN CHARGE (OUTPATIENT)
Age: 31
End: 2021-05-14

## 2021-05-14 ENCOUNTER — OUTPATIENT (OUTPATIENT)
Dept: OUTPATIENT SERVICES | Facility: HOSPITAL | Age: 31
LOS: 1 days | End: 2021-05-14

## 2021-05-14 ENCOUNTER — APPOINTMENT (OUTPATIENT)
Dept: OBGYN | Facility: HOSPITAL | Age: 31
End: 2021-05-14

## 2021-05-14 VITALS
SYSTOLIC BLOOD PRESSURE: 108 MMHG | DIASTOLIC BLOOD PRESSURE: 57 MMHG | HEART RATE: 79 BPM | BODY MASS INDEX: 28.04 KG/M2 | WEIGHT: 179 LBS

## 2021-05-14 PROCEDURE — 76816 OB US FOLLOW-UP PER FETUS: CPT | Mod: 26

## 2021-05-14 PROCEDURE — 76819 FETAL BIOPHYS PROFIL W/O NST: CPT | Mod: 26

## 2021-05-20 DIAGNOSIS — Z34.93 ENCOUNTER FOR SUPERVISION OF NORMAL PREGNANCY, UNSPECIFIED, THIRD TRIMESTER: ICD-10-CM

## 2021-05-20 DIAGNOSIS — Z23 ENCOUNTER FOR IMMUNIZATION: ICD-10-CM

## 2021-05-30 ENCOUNTER — OUTPATIENT (OUTPATIENT)
Dept: INPATIENT UNIT | Facility: HOSPITAL | Age: 31
LOS: 1 days | Discharge: HOME | End: 2021-05-30
Payer: MEDICAID

## 2021-05-30 VITALS
SYSTOLIC BLOOD PRESSURE: 100 MMHG | HEART RATE: 74 BPM | RESPIRATION RATE: 16 BRPM | DIASTOLIC BLOOD PRESSURE: 55 MMHG | TEMPERATURE: 99 F

## 2021-05-30 VITALS — DIASTOLIC BLOOD PRESSURE: 53 MMHG | HEART RATE: 71 BPM | SYSTOLIC BLOOD PRESSURE: 99 MMHG

## 2021-05-30 DIAGNOSIS — Z96.612 PRESENCE OF LEFT ARTIFICIAL SHOULDER JOINT: Chronic | ICD-10-CM

## 2021-05-30 DIAGNOSIS — Z3A.00 WEEKS OF GESTATION OF PREGNANCY NOT SPECIFIED: ICD-10-CM

## 2021-05-30 DIAGNOSIS — O26.899 OTHER SPECIFIED PREGNANCY RELATED CONDITIONS, UNSPECIFIED TRIMESTER: ICD-10-CM

## 2021-05-30 LAB
APPEARANCE UR: CLEAR — SIGNIFICANT CHANGE UP
BACTERIA # UR AUTO: NEGATIVE — SIGNIFICANT CHANGE UP
BILIRUB UR-MCNC: NEGATIVE — SIGNIFICANT CHANGE UP
COLOR SPEC: SIGNIFICANT CHANGE UP
DIFF PNL FLD: NEGATIVE — SIGNIFICANT CHANGE UP
EPI CELLS # UR: 3 /HPF — SIGNIFICANT CHANGE UP (ref 0–5)
GLUCOSE UR QL: NEGATIVE — SIGNIFICANT CHANGE UP
HYALINE CASTS # UR AUTO: 1 /LPF — SIGNIFICANT CHANGE UP (ref 0–7)
KETONES UR-MCNC: NEGATIVE — SIGNIFICANT CHANGE UP
LEUKOCYTE ESTERASE UR-ACNC: ABNORMAL
NITRITE UR-MCNC: NEGATIVE — SIGNIFICANT CHANGE UP
PH UR: 7 — SIGNIFICANT CHANGE UP (ref 5–8)
PROT UR-MCNC: NEGATIVE — SIGNIFICANT CHANGE UP
RBC CASTS # UR COMP ASSIST: 1 /HPF — SIGNIFICANT CHANGE UP (ref 0–4)
SP GR SPEC: 1.02 — SIGNIFICANT CHANGE UP (ref 1.01–1.02)
UROBILINOGEN FLD QL: SIGNIFICANT CHANGE UP
WBC UR QL: 2 /HPF — SIGNIFICANT CHANGE UP (ref 0–5)

## 2021-05-30 PROCEDURE — 76815 OB US LIMITED FETUS(S): CPT | Mod: 26

## 2021-05-30 PROCEDURE — 76817 TRANSVAGINAL US OBSTETRIC: CPT | Mod: 26

## 2021-05-30 PROCEDURE — 99203 OFFICE O/P NEW LOW 30 MIN: CPT | Mod: 25

## 2021-05-30 NOTE — OB PROVIDER TRIAGE NOTE - HISTORY OF PRESENT ILLNESS
29yo  @ 32.1 presents with c/o decreased FM since this morning. Also reports feeling uterine cramping 1x an hour and pain when walking x 2 weeks in area of mons pubis. Also reports increase in discharge in past 2 weeks. Denies VB, dysuria.   H/O COVID-29 2020 Left Shoulder Sx

## 2021-05-30 NOTE — OB PROVIDER TRIAGE NOTE - NS_OBGYNHISTORY_OBGYN_ALL_OB_FT
11/10/2014 Ft  6-7  3/1/2013 Ft  9-0    AP course uncomplicated by velamentous cord insertion. 11/10/2014 Ft  6-7  3/1/2013 Ft  9-0    AP course uncomplicated by velamentous cord insertion.  ATU US from - BPP , EFW 1495g 42%, PARTHA 13

## 2021-05-30 NOTE — OB RN TRIAGE NOTE - NS_OBGYNHISTORY_OBGYN_ALL_OB_FT
11/10/2014 Ft  6-7  3/1/2013 Ft  9-0    AP course uncomplicated by velamentous cord insertion.  ATU US from - BPP , EFW 1495g 42%, PARTHA 13

## 2021-05-30 NOTE — OB PROVIDER TRIAGE NOTE - NSHPPHYSICALEXAM_GEN_ALL_CORE
Assessment reveals VSS, abdomen soft, NT, gravid.   Cat 1 FHT, no ctx on toco.  BPP 8/8, PARTHA 7.8, posterior placenta, EFW 1783g, breech  SSE- cervix appears closed.  Neg pooling, neg nitrizine, neg fern  CL 5.5 no funneling or dynamic changes.   UA sent    Patient reports feeling GFM at this time. Assessment reveals VSS, abdomen soft, NT, gravid.   Cat 1 FHT, no ctx on toco.  BPP 8/8, PARTHA 7.8, posterior placenta, EFW 1783g, breech  SSE- cervix appears closed.  Neg pooling, neg nitrizine, neg fern  CL 5.5 no funneling or dynamic changes.   UA sent    Patient reports feeling GFM at this time.    Urinalysis Basic - ( 30 May 2021 15:31 )    Color: Light Yellow / Appearance: Clear / S.016 / pH: x  Gluc: x / Ketone: Negative  / Bili: Negative / Urobili: <2 mg/dL   Blood: x / Protein: Negative / Nitrite: Negative   Leuk Esterase: Small / RBC: 1 /HPF / WBC 2 /HPF   Sq Epi: x / Non Sq Epi: 3 /HPF / Bacteria: Negative

## 2021-05-30 NOTE — OB PROVIDER TRIAGE NOTE - NSVAGDELIVDETA1_OBGYN_ALL_OB
reviewed abdominal xray - stool noted, will prescribe miralax, pt to follow up with pediatrician return to the ed for any worsening sxs
Spontaneous vertex

## 2021-05-30 NOTE — OB PROVIDER TRIAGE NOTE - NSOBPROVIDERNOTE_OBGYN_ALL_OB_FT
Plan D/W Dr. Carey, no evidence of acute process at this time or ROM.   Likely normal discomforts of pregnancy.   Follow up for fluid check in ATU this week.   Call MD for worsening of symptoms.

## 2021-05-30 NOTE — OB RN TRIAGE NOTE - PSH
(normal spontaneous vaginal delivery)   FT 6#7   FT 9#    (normal spontaneous vaginal delivery)   FT 6#7   FT 9#  Status post total shoulder arthroplasty, left  2015

## 2021-05-30 NOTE — OB PROVIDER TRIAGE NOTE - PSH
(normal spontaneous vaginal delivery)   FT 6#7   FT 9#  Status post total shoulder arthroplasty, left  2015

## 2021-06-02 ENCOUNTER — NON-APPOINTMENT (OUTPATIENT)
Age: 31
End: 2021-06-02

## 2021-06-04 ENCOUNTER — NON-APPOINTMENT (OUTPATIENT)
Age: 31
End: 2021-06-04

## 2021-06-04 ENCOUNTER — ASOB RESULT (OUTPATIENT)
Age: 31
End: 2021-06-04

## 2021-06-04 ENCOUNTER — APPOINTMENT (OUTPATIENT)
Dept: ANTEPARTUM | Facility: CLINIC | Age: 31
End: 2021-06-04
Payer: MEDICAID

## 2021-06-04 ENCOUNTER — APPOINTMENT (OUTPATIENT)
Dept: OBGYN | Facility: HOSPITAL | Age: 31
End: 2021-06-04

## 2021-06-04 ENCOUNTER — OUTPATIENT (OUTPATIENT)
Dept: OUTPATIENT SERVICES | Facility: HOSPITAL | Age: 31
LOS: 1 days | End: 2021-06-04

## 2021-06-04 VITALS
DIASTOLIC BLOOD PRESSURE: 58 MMHG | WEIGHT: 180 LBS | TEMPERATURE: 98.2 F | SYSTOLIC BLOOD PRESSURE: 113 MMHG | BODY MASS INDEX: 28.25 KG/M2 | HEART RATE: 78 BPM | HEIGHT: 67 IN

## 2021-06-04 DIAGNOSIS — Z96.612 PRESENCE OF LEFT ARTIFICIAL SHOULDER JOINT: Chronic | ICD-10-CM

## 2021-06-04 DIAGNOSIS — Z34.83 ENCOUNTER FOR SUPERVISION OF OTHER NORMAL PREGNANCY, THIRD TRIMESTER: ICD-10-CM

## 2021-06-04 PROCEDURE — 76820 UMBILICAL ARTERY ECHO: CPT | Mod: 26

## 2021-06-04 PROCEDURE — 76819 FETAL BIOPHYS PROFIL W/O NST: CPT | Mod: 26

## 2021-06-04 PROCEDURE — 76816 OB US FOLLOW-UP PER FETUS: CPT | Mod: 26

## 2021-06-16 ENCOUNTER — NON-APPOINTMENT (OUTPATIENT)
Age: 31
End: 2021-06-16

## 2021-06-18 ENCOUNTER — ASOB RESULT (OUTPATIENT)
Age: 31
End: 2021-06-18

## 2021-06-18 ENCOUNTER — NON-APPOINTMENT (OUTPATIENT)
Age: 31
End: 2021-06-18

## 2021-06-18 ENCOUNTER — APPOINTMENT (OUTPATIENT)
Dept: OBGYN | Facility: HOSPITAL | Age: 31
End: 2021-06-18

## 2021-06-18 ENCOUNTER — OUTPATIENT (OUTPATIENT)
Dept: OUTPATIENT SERVICES | Facility: HOSPITAL | Age: 31
LOS: 1 days | End: 2021-06-18

## 2021-06-18 ENCOUNTER — APPOINTMENT (OUTPATIENT)
Dept: ANTEPARTUM | Facility: CLINIC | Age: 31
End: 2021-06-18
Payer: MEDICAID

## 2021-06-18 VITALS
SYSTOLIC BLOOD PRESSURE: 119 MMHG | HEART RATE: 68 BPM | TEMPERATURE: 98.2 F | WEIGHT: 180 LBS | BODY MASS INDEX: 28.25 KG/M2 | HEIGHT: 67 IN | DIASTOLIC BLOOD PRESSURE: 71 MMHG

## 2021-06-18 DIAGNOSIS — Z96.612 PRESENCE OF LEFT ARTIFICIAL SHOULDER JOINT: Chronic | ICD-10-CM

## 2021-06-18 DIAGNOSIS — Z34.92 ENCOUNTER FOR SUPERVISION OF NORMAL PREGNANCY, UNSPECIFIED, SECOND TRIMESTER: ICD-10-CM

## 2021-06-18 PROCEDURE — 76819 FETAL BIOPHYS PROFIL W/O NST: CPT | Mod: 26

## 2021-06-18 PROCEDURE — 76816 OB US FOLLOW-UP PER FETUS: CPT | Mod: 26

## 2021-06-22 DIAGNOSIS — Z34.83 ENCOUNTER FOR SUPERVISION OF OTHER NORMAL PREGNANCY, THIRD TRIMESTER: ICD-10-CM

## 2021-06-23 ENCOUNTER — NON-APPOINTMENT (OUTPATIENT)
Age: 31
End: 2021-06-23

## 2021-06-24 ENCOUNTER — APPOINTMENT (OUTPATIENT)
Dept: OBGYN | Facility: HOSPITAL | Age: 31
End: 2021-06-24

## 2021-06-24 ENCOUNTER — NON-APPOINTMENT (OUTPATIENT)
Age: 31
End: 2021-06-24

## 2021-06-24 ENCOUNTER — OUTPATIENT (OUTPATIENT)
Dept: OUTPATIENT SERVICES | Facility: HOSPITAL | Age: 31
LOS: 1 days | End: 2021-06-24

## 2021-06-24 VITALS
DIASTOLIC BLOOD PRESSURE: 63 MMHG | SYSTOLIC BLOOD PRESSURE: 109 MMHG | HEART RATE: 72 BPM | TEMPERATURE: 97.2 F | WEIGHT: 182 LBS | BODY MASS INDEX: 28.51 KG/M2

## 2021-06-24 DIAGNOSIS — Z96.612 PRESENCE OF LEFT ARTIFICIAL SHOULDER JOINT: Chronic | ICD-10-CM

## 2021-06-28 DIAGNOSIS — Z34.83 ENCOUNTER FOR SUPERVISION OF OTHER NORMAL PREGNANCY, THIRD TRIMESTER: ICD-10-CM

## 2021-06-28 DIAGNOSIS — O99.019 ANEMIA COMPLICATING PREGNANCY, UNSPECIFIED TRIMESTER: ICD-10-CM

## 2021-06-28 DIAGNOSIS — O43.129 VELAMENTOUS INSERTION OF UMBILICAL CORD, UNSPECIFIED TRIMESTER: ICD-10-CM

## 2021-06-28 DIAGNOSIS — R79.89 OTHER SPECIFIED ABNORMAL FINDINGS OF BLOOD CHEMISTRY: ICD-10-CM

## 2021-07-01 ENCOUNTER — RESULT REVIEW (OUTPATIENT)
Age: 31
End: 2021-07-01

## 2021-07-01 ENCOUNTER — NON-APPOINTMENT (OUTPATIENT)
Age: 31
End: 2021-07-01

## 2021-07-01 ENCOUNTER — OUTPATIENT (OUTPATIENT)
Dept: OUTPATIENT SERVICES | Facility: HOSPITAL | Age: 31
LOS: 1 days | End: 2021-07-01

## 2021-07-01 ENCOUNTER — APPOINTMENT (OUTPATIENT)
Dept: OBGYN | Facility: HOSPITAL | Age: 31
End: 2021-07-01

## 2021-07-01 VITALS
DIASTOLIC BLOOD PRESSURE: 65 MMHG | TEMPERATURE: 98.2 F | HEART RATE: 69 BPM | BODY MASS INDEX: 28.51 KG/M2 | WEIGHT: 182 LBS | SYSTOLIC BLOOD PRESSURE: 107 MMHG

## 2021-07-01 DIAGNOSIS — L81.9 DISORDER OF PIGMENTATION, UNSPECIFIED: ICD-10-CM

## 2021-07-01 DIAGNOSIS — O99.019 ANEMIA COMPLICATING PREGNANCY, UNSPECIFIED TRIMESTER: ICD-10-CM

## 2021-07-01 DIAGNOSIS — Z34.83 ENCOUNTER FOR SUPERVISION OF OTHER NORMAL PREGNANCY, THIRD TRIMESTER: ICD-10-CM

## 2021-07-01 DIAGNOSIS — O43.129 VELAMENTOUS INSERTION OF UMBILICAL CORD, UNSPECIFIED TRIMESTER: ICD-10-CM

## 2021-07-01 DIAGNOSIS — Z96.612 PRESENCE OF LEFT ARTIFICIAL SHOULDER JOINT: Chronic | ICD-10-CM

## 2021-07-01 DIAGNOSIS — R79.89 OTHER SPECIFIED ABNORMAL FINDINGS OF BLOOD CHEMISTRY: ICD-10-CM

## 2021-07-01 LAB
BASOPHILS # BLD AUTO: 0.01 K/UL — SIGNIFICANT CHANGE UP (ref 0–0.2)
BASOPHILS NFR BLD AUTO: 0.2 % — SIGNIFICANT CHANGE UP (ref 0–2)
COVID-19 SPIKE DOMAIN AB INTERP: POSITIVE
COVID-19 SPIKE DOMAIN ANTIBODY RESULT: >250 U/ML — HIGH
EOSINOPHIL # BLD AUTO: 0.06 K/UL — SIGNIFICANT CHANGE UP (ref 0–0.5)
EOSINOPHIL NFR BLD AUTO: 0.9 % — SIGNIFICANT CHANGE UP (ref 0–6)
HCT VFR BLD CALC: 34.8 % — SIGNIFICANT CHANGE UP (ref 34.5–45)
HGB BLD-MCNC: 11.6 G/DL — SIGNIFICANT CHANGE UP (ref 11.5–15.5)
HIV 1+2 AB+HIV1 P24 AG SERPL QL IA: SIGNIFICANT CHANGE UP
IANC: 4.89 K/UL — SIGNIFICANT CHANGE UP (ref 1.5–8.5)
IMM GRANULOCYTES NFR BLD AUTO: 0.5 % — SIGNIFICANT CHANGE UP (ref 0–1.5)
LYMPHOCYTES # BLD AUTO: 1.16 K/UL — SIGNIFICANT CHANGE UP (ref 1–3.3)
LYMPHOCYTES # BLD AUTO: 17.6 % — SIGNIFICANT CHANGE UP (ref 13–44)
MCHC RBC-ENTMCNC: 29.1 PG — SIGNIFICANT CHANGE UP (ref 27–34)
MCHC RBC-ENTMCNC: 33.3 GM/DL — SIGNIFICANT CHANGE UP (ref 32–36)
MCV RBC AUTO: 87.2 FL — SIGNIFICANT CHANGE UP (ref 80–100)
MONOCYTES # BLD AUTO: 0.43 K/UL — SIGNIFICANT CHANGE UP (ref 0–0.9)
MONOCYTES NFR BLD AUTO: 6.5 % — SIGNIFICANT CHANGE UP (ref 2–14)
NEUTROPHILS # BLD AUTO: 4.89 K/UL — SIGNIFICANT CHANGE UP (ref 1.8–7.4)
NEUTROPHILS NFR BLD AUTO: 74.3 % — SIGNIFICANT CHANGE UP (ref 43–77)
NRBC # BLD: 0 /100 WBCS — SIGNIFICANT CHANGE UP
NRBC # FLD: 0 K/UL — SIGNIFICANT CHANGE UP
PLATELET # BLD AUTO: 181 K/UL — SIGNIFICANT CHANGE UP (ref 150–400)
RBC # BLD: 3.99 M/UL — SIGNIFICANT CHANGE UP (ref 3.8–5.2)
RBC # FLD: 12.6 % — SIGNIFICANT CHANGE UP (ref 10.3–14.5)
SARS-COV-2 IGG+IGM SERPL QL IA: >250 U/ML — HIGH
SARS-COV-2 IGG+IGM SERPL QL IA: POSITIVE
WBC # BLD: 6.58 K/UL — SIGNIFICANT CHANGE UP (ref 3.8–10.5)
WBC # FLD AUTO: 6.58 K/UL — SIGNIFICANT CHANGE UP (ref 3.8–10.5)

## 2021-07-04 LAB
CULTURE RESULTS: SIGNIFICANT CHANGE UP
SPECIMEN SOURCE: SIGNIFICANT CHANGE UP

## 2021-07-07 ENCOUNTER — NON-APPOINTMENT (OUTPATIENT)
Age: 31
End: 2021-07-07

## 2021-07-09 ENCOUNTER — NON-APPOINTMENT (OUTPATIENT)
Age: 31
End: 2021-07-09

## 2021-07-09 ENCOUNTER — OUTPATIENT (OUTPATIENT)
Dept: OUTPATIENT SERVICES | Facility: HOSPITAL | Age: 31
LOS: 1 days | End: 2021-07-09

## 2021-07-09 ENCOUNTER — APPOINTMENT (OUTPATIENT)
Dept: OBGYN | Facility: HOSPITAL | Age: 31
End: 2021-07-09

## 2021-07-09 VITALS
DIASTOLIC BLOOD PRESSURE: 68 MMHG | HEART RATE: 64 BPM | SYSTOLIC BLOOD PRESSURE: 109 MMHG | BODY MASS INDEX: 29.13 KG/M2 | TEMPERATURE: 97.7 F | WEIGHT: 186 LBS

## 2021-07-09 DIAGNOSIS — Z96.612 PRESENCE OF LEFT ARTIFICIAL SHOULDER JOINT: Chronic | ICD-10-CM

## 2021-07-09 DIAGNOSIS — Z34.83 ENCOUNTER FOR SUPERVISION OF OTHER NORMAL PREGNANCY, THIRD TRIMESTER: ICD-10-CM

## 2021-07-14 ENCOUNTER — NON-APPOINTMENT (OUTPATIENT)
Age: 31
End: 2021-07-14

## 2021-07-14 ENCOUNTER — APPOINTMENT (OUTPATIENT)
Dept: OBGYN | Facility: HOSPITAL | Age: 31
End: 2021-07-14

## 2021-07-14 ENCOUNTER — OUTPATIENT (OUTPATIENT)
Dept: OUTPATIENT SERVICES | Facility: HOSPITAL | Age: 31
LOS: 1 days | End: 2021-07-14

## 2021-07-14 VITALS
TEMPERATURE: 98.1 F | DIASTOLIC BLOOD PRESSURE: 66 MMHG | SYSTOLIC BLOOD PRESSURE: 107 MMHG | WEIGHT: 187.56 LBS | RESPIRATION RATE: 20 BRPM | BODY MASS INDEX: 29.38 KG/M2 | HEART RATE: 75 BPM

## 2021-07-14 DIAGNOSIS — Z96.612 PRESENCE OF LEFT ARTIFICIAL SHOULDER JOINT: Chronic | ICD-10-CM

## 2021-07-14 DIAGNOSIS — Z34.83 ENCOUNTER FOR SUPERVISION OF OTHER NORMAL PREGNANCY, THIRD TRIMESTER: ICD-10-CM

## 2021-07-14 DIAGNOSIS — O43.129 VELAMENTOUS INSERTION OF UMBILICAL CORD, UNSPECIFIED TRIMESTER: ICD-10-CM

## 2021-07-15 DIAGNOSIS — Z34.83 ENCOUNTER FOR SUPERVISION OF OTHER NORMAL PREGNANCY, THIRD TRIMESTER: ICD-10-CM

## 2021-07-15 DIAGNOSIS — O43.129 VELAMENTOUS INSERTION OF UMBILICAL CORD, UNSPECIFIED TRIMESTER: ICD-10-CM

## 2021-07-16 ENCOUNTER — ASOB RESULT (OUTPATIENT)
Age: 31
End: 2021-07-16

## 2021-07-16 ENCOUNTER — APPOINTMENT (OUTPATIENT)
Dept: ANTEPARTUM | Facility: CLINIC | Age: 31
End: 2021-07-16
Payer: MEDICAID

## 2021-07-16 PROCEDURE — 76816 OB US FOLLOW-UP PER FETUS: CPT | Mod: 26

## 2021-07-16 PROCEDURE — 76819 FETAL BIOPHYS PROFIL W/O NST: CPT | Mod: 26

## 2021-07-18 ENCOUNTER — NON-APPOINTMENT (OUTPATIENT)
Age: 31
End: 2021-07-18

## 2021-07-18 ENCOUNTER — TRANSCRIPTION ENCOUNTER (OUTPATIENT)
Age: 31
End: 2021-07-18

## 2021-07-18 ENCOUNTER — INPATIENT (INPATIENT)
Facility: HOSPITAL | Age: 31
LOS: 0 days | Discharge: HOME | End: 2021-07-19
Attending: SPECIALIST | Admitting: SPECIALIST
Payer: MEDICAID

## 2021-07-18 VITALS
HEART RATE: 68 BPM | SYSTOLIC BLOOD PRESSURE: 112 MMHG | RESPIRATION RATE: 18 BRPM | DIASTOLIC BLOOD PRESSURE: 74 MMHG | TEMPERATURE: 99 F

## 2021-07-18 DIAGNOSIS — Z96.612 PRESENCE OF LEFT ARTIFICIAL SHOULDER JOINT: Chronic | ICD-10-CM

## 2021-07-18 DIAGNOSIS — Z3A.00 WEEKS OF GESTATION OF PREGNANCY NOT SPECIFIED: ICD-10-CM

## 2021-07-18 DIAGNOSIS — O26.899 OTHER SPECIFIED PREGNANCY RELATED CONDITIONS, UNSPECIFIED TRIMESTER: ICD-10-CM

## 2021-07-18 LAB
BASOPHILS # BLD AUTO: 0.02 K/UL — SIGNIFICANT CHANGE UP (ref 0–0.2)
BASOPHILS NFR BLD AUTO: 0.3 % — SIGNIFICANT CHANGE UP (ref 0–2)
BLD GP AB SCN SERPL QL: NEGATIVE — SIGNIFICANT CHANGE UP
COVID-19 SPIKE DOMAIN AB INTERP: POSITIVE
COVID-19 SPIKE DOMAIN ANTIBODY RESULT: >250 U/ML — HIGH
EOSINOPHIL # BLD AUTO: 0.07 K/UL — SIGNIFICANT CHANGE UP (ref 0–0.5)
EOSINOPHIL NFR BLD AUTO: 1.2 % — SIGNIFICANT CHANGE UP (ref 0–6)
HCT VFR BLD CALC: 33 % — LOW (ref 34.5–45)
HGB BLD-MCNC: 11 G/DL — LOW (ref 11.5–15.5)
IANC: 3.81 K/UL — SIGNIFICANT CHANGE UP (ref 1.5–8.5)
IMM GRANULOCYTES NFR BLD AUTO: 0.3 % — SIGNIFICANT CHANGE UP (ref 0–1.5)
LYMPHOCYTES # BLD AUTO: 1.43 K/UL — SIGNIFICANT CHANGE UP (ref 1–3.3)
LYMPHOCYTES # BLD AUTO: 24.6 % — SIGNIFICANT CHANGE UP (ref 13–44)
MCHC RBC-ENTMCNC: 29.3 PG — SIGNIFICANT CHANGE UP (ref 27–34)
MCHC RBC-ENTMCNC: 33.3 GM/DL — SIGNIFICANT CHANGE UP (ref 32–36)
MCV RBC AUTO: 87.8 FL — SIGNIFICANT CHANGE UP (ref 80–100)
MONOCYTES # BLD AUTO: 0.47 K/UL — SIGNIFICANT CHANGE UP (ref 0–0.9)
MONOCYTES NFR BLD AUTO: 8.1 % — SIGNIFICANT CHANGE UP (ref 2–14)
NEUTROPHILS # BLD AUTO: 3.81 K/UL — SIGNIFICANT CHANGE UP (ref 1.8–7.4)
NEUTROPHILS NFR BLD AUTO: 65.5 % — SIGNIFICANT CHANGE UP (ref 43–77)
NRBC # BLD: 0 /100 WBCS — SIGNIFICANT CHANGE UP
NRBC # FLD: 0 K/UL — SIGNIFICANT CHANGE UP
PLATELET # BLD AUTO: 143 K/UL — LOW (ref 150–400)
RBC # BLD: 3.76 M/UL — LOW (ref 3.8–5.2)
RBC # FLD: 13 % — SIGNIFICANT CHANGE UP (ref 10.3–14.5)
RH IG SCN BLD-IMP: POSITIVE — SIGNIFICANT CHANGE UP
SARS-COV-2 IGG+IGM SERPL QL IA: >250 U/ML — HIGH
SARS-COV-2 IGG+IGM SERPL QL IA: POSITIVE
SARS-COV-2 RNA SPEC QL NAA+PROBE: SIGNIFICANT CHANGE UP
T PALLIDUM AB TITR SER: NEGATIVE — SIGNIFICANT CHANGE UP
WBC # BLD: 5.82 K/UL — SIGNIFICANT CHANGE UP (ref 3.8–10.5)
WBC # FLD AUTO: 5.82 K/UL — SIGNIFICANT CHANGE UP (ref 3.8–10.5)

## 2021-07-18 PROCEDURE — 58300 INSERT INTRAUTERINE DEVICE: CPT

## 2021-07-18 PROCEDURE — 59409 OBSTETRICAL CARE: CPT | Mod: U9,UB,GC

## 2021-07-18 RX ORDER — OXYCODONE HYDROCHLORIDE 5 MG/1
5 TABLET ORAL ONCE
Refills: 0 | Status: DISCONTINUED | OUTPATIENT
Start: 2021-07-18 | End: 2021-07-19

## 2021-07-18 RX ORDER — IBUPROFEN 200 MG
600 TABLET ORAL EVERY 6 HOURS
Refills: 0 | Status: COMPLETED | OUTPATIENT
Start: 2021-07-18 | End: 2022-06-16

## 2021-07-18 RX ORDER — KETOROLAC TROMETHAMINE 30 MG/ML
30 SYRINGE (ML) INJECTION ONCE
Refills: 0 | Status: DISCONTINUED | OUTPATIENT
Start: 2021-07-18 | End: 2021-07-19

## 2021-07-18 RX ORDER — IBUPROFEN 200 MG
600 TABLET ORAL EVERY 6 HOURS
Refills: 0 | Status: DISCONTINUED | OUTPATIENT
Start: 2021-07-18 | End: 2021-07-19

## 2021-07-18 RX ORDER — OXYTOCIN 10 UNIT/ML
2 VIAL (ML) INJECTION
Qty: 30 | Refills: 0 | Status: DISCONTINUED | OUTPATIENT
Start: 2021-07-18 | End: 2021-07-19

## 2021-07-18 RX ORDER — OXYTOCIN 10 UNIT/ML
333.33 VIAL (ML) INJECTION
Qty: 20 | Refills: 0 | Status: DISCONTINUED | OUTPATIENT
Start: 2021-07-18 | End: 2021-07-19

## 2021-07-18 RX ORDER — ACETAMINOPHEN 500 MG
975 TABLET ORAL
Refills: 0 | Status: DISCONTINUED | OUTPATIENT
Start: 2021-07-18 | End: 2021-07-19

## 2021-07-18 RX ORDER — SODIUM CHLORIDE 9 MG/ML
1000 INJECTION, SOLUTION INTRAVENOUS
Refills: 0 | Status: DISCONTINUED | OUTPATIENT
Start: 2021-07-18 | End: 2021-07-18

## 2021-07-18 RX ORDER — OXYCODONE HYDROCHLORIDE 5 MG/1
5 TABLET ORAL
Refills: 0 | Status: DISCONTINUED | OUTPATIENT
Start: 2021-07-18 | End: 2021-07-19

## 2021-07-18 RX ORDER — OXYTOCIN 10 UNIT/ML
2 VIAL (ML) INJECTION
Qty: 30 | Refills: 0 | Status: DISCONTINUED | OUTPATIENT
Start: 2021-07-18 | End: 2021-07-18

## 2021-07-18 RX ADMIN — SODIUM CHLORIDE 125 MILLILITER(S): 9 INJECTION, SOLUTION INTRAVENOUS at 08:51

## 2021-07-18 RX ADMIN — Medication 2 MILLIUNIT(S)/MIN: at 09:39

## 2021-07-18 NOTE — OB PROVIDER LABOR PROGRESS NOTE - NS_SUBJECTIVE/OBJECTIVE_OBGYN_ALL_OB_FT
R3 Note 07-18-21 @ 09:08    Patient seen for AROM    VITALS:  T(C): 37.2 (07-18-21 @ 07:22), Max: 37.2 (07-18-21 @ 07:16)  HR: 64 (07-18-21 @ 08:39) (63 - 68)  BP: 122/72 (07-18-21 @ 08:39) (110/66 - 122/72)  RR: 18 (07-18-21 @ 07:16) (18 - 18)  SpO2: --

## 2021-07-18 NOTE — DISCHARGE NOTE OB - MEDICATION SUMMARY - MEDICATIONS TO TAKE
I will START or STAY ON the medications listed below when I get home from the hospital:    acetaminophen 325 mg oral tablet  -- 3 tab(s) by mouth   -- Indication: For for pain    ibuprofen 600 mg oral tablet  -- 1 tab(s) by mouth every 6 hours  -- Indication: For for pain    Iron 100 Plus oral tablet  -- 1 tab(s) by mouth once a day  -- Indication: For home medication    Prenatal 1 Plus 1 oral tablet  -- 1 tab(s) by mouth once a day  -- Indication: For home medication

## 2021-07-18 NOTE — DISCHARGE NOTE OB - HOSPITAL COURSE
Pt admitted after presenting to triage w/ c/o of more frequent and stronger contractions. Patient had an uncomplicated  followed by an uncomplicated postpartum course. A post-partum Mirena IUD was inserted under US guidance. , Hct ***. On postpartum day 2, patient was discharged home in stable condition, voiding spontaneously, and with normal vital signs.    Pt admitted after presenting to triage w/ c/o of more frequent and stronger contractions. Patient had an uncomplicated  of a liveborn male followed by an uncomplicated postpartum course with an EBL of 300, and left labial hematoma noted. A post-partum Mirena IUD was inserted under US guidance. On postpartum day 1, patient was discharged home in stable condition, voiding spontaneously, and with normal vital signs.    Patient had uncomplicated vaginal delivery of a liveborn male, with left labial hematoma noted. Immediately following delivery, Mirena IUD was placed under ultrasound guidance for postpartum birth control. During postpartum course patient's vitals were stable, vaginal bleeding appropriate, and pain well controlled. On day of discharge patient was ambulating, her pain controlled with oral medications, had adequate oral intake, voiding freely, and labial hematoma was resolved.  Discharge instructions and precautions were given.  Will return to clinic in 6 weeks for postpartum visit.     Patient had uncomplicated vaginal delivery of a liveborn male, with left labial hematoma noted. Immediately following delivery, Mirena IUD was placed under ultrasound guidance for postpartum birth control. During postpartum course patient's vitals were stable, vaginal bleeding appropriate, and pain well controlled. On day of discharge patient was ambulating, her pain controlled with oral medications, had adequate oral intake, voiding freely, and labial hematoma was resolved.  Discharge instructions and precautions were given.  Will return to clinic in 6 weeks for postpartum visit and IUD evaluation.

## 2021-07-18 NOTE — DISCHARGE NOTE OB - CARE PLAN
Principal Discharge DX:	 (normal spontaneous vaginal delivery)  Goal:	Recover  Assessment and plan of treatment:	Make your follow-up appointment with your doctor as ordered. No heavy lifting, driving, or strenuous activity for 6 weeks. Nothing per vagina such as tampons, intercourse, douches or tub baths for 6 weeks or until you see your doctor. Call your doctor with any signs and symptoms of infection such as fever, chills, nausea or vomiting. Call your doctor if you’re unable to tolerate food, increase in vaginal bleeding, or have difficulty urinating. Call your doctor if you have pain that is not relieved by your prescribed medications. Notify your doctor with any other concerns.    Call [phone] if you have any of these concerns in the next 6 weeks.

## 2021-07-18 NOTE — OB PROVIDER H&P - ASSESSMENT
Patient is a 29y/o  @ 39 1/7wks gest. jesus manuel, CAT 2 FHT.  GBS negative   Patient is a 31y/o  @ 39 1/7wks gest. jesus manuel, CAT 2 FHT.  GBS negative          Attending Addendum:   Agree with above note. Pt presenting with pelvic pressure, cat 2 FHT. Will plan for IOL with AROM and pitocin.    AMMON Montes MD

## 2021-07-18 NOTE — OB PROVIDER H&P - NSHPPHYSICALEXAM_GEN_ALL_CORE
Vital Signs   T(C): 37.2 (18 Jul 2021 07:22), Max: 37.2 (18 Jul 2021 07:16)  T(F): 98.96 (18 Jul 2021 07:22), Max: 99 (18 Jul 2021 07:16)  HR: 68 (18 Jul 2021 07:24) (68 - 68)  BP: 112/74 (18 Jul 2021 07:24) (112/74 - 112/74)  RR: 18 (18 Jul 2021 07:16) (18 - 18)    Abdomen gravid, soft and nontender  Continue EFM, CAT 2 FHT, Late & variables   SVE - 2/80/0 Intact  Cephalic presentation  GBS negative  Clinical EFW - 2948G

## 2021-07-18 NOTE — DISCHARGE NOTE OB - MATERIALS PROVIDED
[facility] [place] Screening Program/  Immunization Record/Breastfeeding Log/Guide to Postpartum Care/[facility] Hearing Screen Program/Back To Sleep Handout/Shaken Baby Prevention Handout/Birth Certificate Instructions/Tdap Vaccination (VIS Pub Date: 2012)

## 2021-07-18 NOTE — DISCHARGE NOTE OB - COMMUNITY RESOURCE NAME:
Patient will call to schedule a postpartum  appointment for 4 to 6 weeks after delivery date at [facility]  [phone].

## 2021-07-18 NOTE — DISCHARGE NOTE OB - PROVIDER TOKENS
FREE:[LAST:[[facility]],PHONE:[(   )    -],FAX:[(   )    -],ADDRESS:[Please f/u for postpartum appointment in 4-6 weeks @ Ambulatory Clinic Unit, [facility], Oncology/Research Building, Basement level. Please call the office for an appointment. Phone # [phone]]] FREE:[LAST:[STACIA CLEMENT],PHONE:[phone]],FAX:[(   )    -],ADDRESS:[[facility], [place] [address]]]

## 2021-07-18 NOTE — OB PROVIDER TRIAGE NOTE - NSHPPHYSICALEXAM_GEN_ALL_CORE
Vital Signs   T(C): 37.2 (18 Jul 2021 07:22), Max: 37.2 (18 Jul 2021 07:16)  T(F): 98.96 (18 Jul 2021 07:22), Max: 99 (18 Jul 2021 07:16)  HR: 68 (18 Jul 2021 07:24) (68 - 68)  BP: 112/74 (18 Jul 2021 07:24) (112/74 - 112/74)  RR: 18 (18 Jul 2021 07:16) (18 - 18) Vital Signs   T(C): 37.2 (18 Jul 2021 07:22), Max: 37.2 (18 Jul 2021 07:16)  T(F): 98.96 (18 Jul 2021 07:22), Max: 99 (18 Jul 2021 07:16)  HR: 68 (18 Jul 2021 07:24) (68 - 68)  BP: 112/74 (18 Jul 2021 07:24) (112/74 - 112/74)  RR: 18 (18 Jul 2021 07:16) (18 - 18)    Abdomen gravid, soft and nontender  Continue EFM, CAT 2 FHT, Late & variables   SVE - 2/80/0 Intact  Cephalic presentation  GBS negative  Clinical EFW - 2948G

## 2021-07-18 NOTE — OB PROVIDER DELIVERY SUMMARY - NSPROVIDERDELIVERYNOTE_OBGYN_ALL_OB_FT
Attending Addendum: I was present for delivery of a live male infant. Placenta delivered intact. Uterine atony noted, resolved with bimanual and pitocin. Mirena IUD placed under ultrasound guidance, confirmed to be in place following insertion.   AMMON Montes MD Attending Addendum: I was present for delivery of a live male infant. Placenta delivered intact. Uterine atony noted, resolved with bimanual and pitocin. Mirena IUD placed under ultrasound guidance, confirmed to be in place following insertion.   AMMON Montes MD    Mirena Lot #[id number] Spontaneous VD of a liveborn male infant from BEA position. Head, shoulders, and body were easily delivered. Infant suctioned w/ no mec. Delayed cord clamping performed. Cord clamped and cut. Infant subsequently, passed to pediatrics who had been called earlier given CatII tracing. Placenta was delivered intact with a 3 vessel cord. Uterus atonic after delivery of placenta which was managed with fundal massage. After uterus firmed, a Mirena IUD was placed under US guidance via ring-forceps. Subsequent vaginal exam revealed an intact cervix and sulci. There was a 3x2cm left labial hematoma. It was observed and noted to remain hemostatic and not increase in size. Pt stable and count was correct x2.    Mirena Lot #[id number]  Michael Daniel, PGY-1    Attending Addendum: I was present for delivery of a live male infant. Placenta delivered intact. Uterine atony noted, resolved with bimanual and pitocin. Mirena IUD placed under ultrasound guidance, confirmed to be in place following insertion.   AMMON Montes MD Spontaneous VD of a liveborn male infant from BEA position. Head, shoulders, and body were easily delivered. Infant placed on maternal chest. Delayed cord clamping performed. Cord clamped and cut. Infant subsequently, passed to pediatrics. Placenta was delivered intact with a 3 vessel cord, velamentous insertion. Uterus atonic after delivery of placenta which was managed with fundal massage. After uterine atony resolved, Mirena IUD was placed under US guidance via ring-forceps. Subsequent vaginal exam revealed a intact sulci and perineum. There was a 3x2cm left labial hematoma. It was observed and noted to remain hemostatic and not increase in size. Pt stable and count was correct x2.    Mirena Lot #[id number]  Michael Daniel, PGY-1    Attending Addendum: I was present for delivery of a live male infant. Placenta delivered intact. Uterine atony noted, resolved with bimanual and pitocin. Mirena IUD placed under ultrasound guidance, confirmed to be in place following insertion. A small left labial hematoma was noted, observed over multiple minutes and was noted to be stable in size.     AMMON Montes MD

## 2021-07-18 NOTE — OB RN DELIVERY SUMMARY - NSSELHIDDEN_OBGYN_ALL_OB_FT
[NS_DeliveryAttending1_OBGYN_ALL_OB_FT:[id number]],[NS_DeliveryAssist1_OBGYN_ALL_OB_FT:[id number]],[NS_DeliveryAttending2_OBGYN_ALL_OB_FT:[id number]],[NS_DeliveryRN_OBGYN_ALL_OB_FT:[id number]]

## 2021-07-18 NOTE — OB RN DELIVERY SUMMARY - NS_SEPSISRSKCALC_OBGYN_ALL_OB_FT
EOS calculated successfully. EOS Risk Factor: 0.5/1000 live births ([facility] national incidence); GA=39w1d; Temp=99.14; ROM=2.233; GBS='Negative'; Antibiotics='No antibiotics or any antibiotics < 2 hrs prior to birth'

## 2021-07-18 NOTE — OB PROVIDER H&P - HISTORY OF PRESENT ILLNESS
PNC Provider:  STACIA López.  EDC:  2021.    Patient is a 29y/o  @ 39 1/7wks gest. who reports to triage with c/o contractions (q 10min) since yesterday, stronger and more consistent since 0400.  Reports passing blood streaked mucus.  Denies loss of fluid.  Reports good fetal movements.  Patient states that she signed BTL papers on 2021, but does not have her copy:  will have the procedure only if she has a CD.   Desires an IUD insertion if VD.    COVID-19 Status:  COVD-19 infection on 2020; not vaccinated.                  Spouse:  denies COVD-19 infection; fully vaccinated.  AP Course:  villamentous cord insertion  Meds:  pnv, & iron  NKDA    PMHx - anemia; on iron            - eczema  PSHx - lt shoulder arthroplasty -   OBHx  - - 3/1/2013 -  6lbs 6oz  - - 2014 - 9lbs 1oz

## 2021-07-18 NOTE — DISCHARGE NOTE OB - ADDITIONAL INSTRUCTIONS
Please f/u for postpartum appointment in 4-6 weeks @ Ambulatory Clinic Unit, [facility], Oncology/Research Building, Basement level. Please call the office for an appointment. Phone # [phone] o NS: Please f/u for postpartum

## 2021-07-18 NOTE — CHART NOTE - NSCHARTNOTEFT_GEN_A_CORE
Nutrition/Diabetes Progress Report    Rosales Timmons was seen from 1034 to 1136 for medical nutrition therapy for T2DM.  The instruction was given to the patient by both writer and [facility] Dietetic Intern, Opal.    Learning needs were assessed and the patient requires education in diabetes disease process/treatment options, medical nutrition therapy, physical activity, blood glucose monitoring, diabetes medications, acute complications, chronic complications, diabetes psychosocial adjustment and strategies and goal setting.    Material was presented using verbal, written, demonstration and return demonstration.  Food models and nutrition labels were used to demonstrate portion sizes and carbohydrate values.    Highlights of today’s visit:  Initial visit with writer, has seen RN in the past. Pt demonstrated knowledge concerning DM diagnosis and foods that affect blood sugar. Pt stated he has not carbohydrates with his breakfast meal and will work on increasing to recommended intake of 45-60 grams.    Assessment of Home Blood Sugars: Per FreeStyle meter download.   FBS: 104-314  Ranges of other home blood sugars:    Blood sugar before office visit: 221 at 0700    Diabetes Medication Update/Changes:  No changes made.    A1c:   Hemoglobin A1C (%)   Date Value   03/22/2019 12.9 (H)     Medications:   Current Outpatient Medications   Medication Sig Dispense Refill   • blood glucose (FREESTYLE LITE) test strip Test blood sugar 3 times daily as directed. 300 each 1   • insulin glargine (LANTUS SOLOSTAR) 100 UNIT/ML pen-injector Inject 40 Units into the skin nightly. Must come to apt [address] for refills. 45 mL 0   • NOVOLOG FLEXPEN 100 UNIT/ML pen-injector Inject 19 Units into the skin 3 times daily (before meals). 45 mL 1   • TRULICITY 1.5 MG/0.5ML pen-injector INJECT 1.5 MG SUBCUTANEOUSLY ONCE A WEEK 6 mL 2   • Cholecalciferol (VITAMIN D3) 2000 units capsule Take 2,000 Units by mouth daily.     •  R3 Admission Note    30 year old  (LOIDA 21) presenting with intermittent ctx. Not in labor, found to have Cat II tracing.     Hx reviewed as below -     PNC c/b velamentous cord insertion - noted on anatomy sono 3/3/21, serial growths most recent -  as below.   ATU (): VTX, Posterior, Velamentous Cord, PARTHA 14.48, , EFW 2962 (15%)    OBHX:  6# ,  9#   GynHx: denies  PMHx:  Anemia, Denies asthma  PSurgHx: denies  Meds: PNV, Fe  All: NKDA  GBS: negative   Contraception: Pt desires BTL, consents signed, if she needs CS, otherwise for 6 wk PP IUD. Bridge TBD.      Pt to re-examined, eval for AROM and Pit for IOL    anders Ho, C Attending   anders Carey PGY4  Jose PGY3 cyanocobalamin (VITAMIN B-12) 500 MCG tablet Take 1 tablet by mouth daily. 90 tablet 1   • Multiple Vitamins-Minerals (MULTIVITAMIN GUMMIES ADULTS) Chew Tab Chew 1 tablet by mouth daily.     • atorvastatin (LIPITOR) 80 MG tablet TAKE 1 TABLET BY MOUTH ONCE DAILY 90 tablet 1   • hydrochlorothiazide (HYDRODIURIL) 25 MG tablet TAKE 1 TABLET BY MOUTH ONCE DAILY 90 tablet 1   • warfarin (COUMADIN) 2 MG tablet TAKE 4MG BY MOUTH SUNDAY AND TUESDAY AND TAKE 6MG ALL OF THE REMAINING DAYS OF THE WEEK OR AS DIRECTED BY COUMADIN CLINIC 230 tablet 0   • furosemide (LASIX) 40 MG tablet Take 1.5 tablets by mouth 2 times daily. 90 tablet 11   • gentamicin (GARAMYCIN) 0.1 % ointment Apply to affected wounds 3 times daily 30 g 1   • clopidogrel (PLAVIX) 75 MG tablet Take 1 tablet by mouth daily. 30 tablet 3   • carvedilol (COREG) 6.25 MG tablet TAKE 1 TABLET BY MOUTH IN THE MORNING AND 2 TABLETS IN THE EVENING 270 tablet 0   • warfarin (COUMADIN) 2 MG tablet Take 6mg every Tues/Fri and 4mg on all of the remaining days of the week (OR as directed by coumadin Clinic) 70 tablet 0   • nitroGLYcerin (NITROSTAT) 0.4 MG sublingual tablet Place 1 tablet under the tongue every 5 minutes as needed for Chest pain. 90 tablet 12   • warfarin (COUMADIN) 2 MG tablet Take 4-6 mg by mouth as directed. Take 2 tabs (= 4 mg) every Sunday and Tuesday at bedtime   Take 3 tabs (= 6 mg) every Monday, Wednesday, Thursday, Friday and Saturday at bedtime     • lisinopril (ZESTRIL) 5 MG tablet Take 0.5 tablets by mouth daily. 45 tablet 3   • docusate sodium-sennosides (SENOKOT S) 50-8.6 MG per tablet Take 1 tablet by mouth nightly. 90 tablet 0     No current facility-administered medications for this visit.      Food and Nutrition Related History:   Oral fluids: Diet coke (12 oz/day), water (~51 oz/day), OJ (12 oz/day), decaf coffee with powdered creamer (24 oz/day)  Type of food/meals: lunch and dinner carbohydrate steady and well-balanced, pt realized he has  R3 Admission Note    30 year old  (LOIDA 21) presenting with intermittent ctx. Not in labor, found to have Cat II tracing.     Hx reviewed as below -     PNC c/b velamentous cord insertion - noted on anatomy sono 3/3/21, serial growths most recent -  as below.   ATU (): VTX, Posterior, Velamentous Cord, PARTHA 14.48, , EFW 2962 (15%)    OBHX:  6# ,  9#   GynHx: denies  PMHx:  Anemia, Denies asthma  PSurgHx: denies  Meds: PNV, Fe  All: NKDA  GBS: negative   Contraception: Pt desires BTL, consents signed, if she needs CS, otherwise for immediate PP Mirena.     Boy - No Circ  Circ Consent Signed      Pt to re-examined, eval for AROM and Pit for IOL    anders Ho, C Attending   anders Carey PGY4  Jose PGY3 some improvement to make with increasing carbohydrates and variety with breakfast meal.  Meal/Snack pattern: 3 meals per day with 1 snack between breakfast and lunch and one after dinner snack.  Breakfast (0730)  Decaf Coffee (Two 12 oz cups, black with non-dairy creamer)  2 scrambled eggs with black pepper cooked in olive oil spray or 1/2 butter, 1/2 olive oil mixture   Sometimes 1 slice of toast   Lunch (0177-3090)  Pre made protein shake (170 kcal)   with 1 bowl (1 cup) mixed fruit (canteloupe, honeydew, blueberries, strawberries)   Dinner (6453-2689)  Baked or grilled chicken, fish, tough steak (marinated in garlic, olive oil, black pepper, soy sauce)  Vegetables   Snack  Glass of OJ (12 oz)   Snack   Snack (2030)   unsalted cashews, celery with peanut butter, or fruit   Location: home or out to eat (2x/month - [facility]; [facility].  Caregiver/: Home alone. Has older children for support.    Physical Activity: Pt cleared for gym yesterday. Pt reported going to the gym 2 years ago, 4 days per week for 2 hours. Motivation is currently difficult per pt but demonstrated eagerness to start going again. See goal below. Playing cards (hold em) 3x/week for a few hours to 7 hours. Pt demonstrated understanding of previous education as he stated he knew not to exercise with BG >250. Writer and Intern coached pt r/t modifying exercise for food and insulin intake.     Anthropometric Measurements:   Wt Readings from Last 4 Encounters:   04/30/19 (!) 136.1 kg   04/29/19 135.6 kg   04/10/19 (!) 136.9 kg   04/02/19 135.8 kg     Estimated body mass index is 44.31 kg/m² as calculated from the following:    Height as of 4/10/19: 5' 9\" (1.753 m).    Weight as of this encounter: 136.1 kg.  Biochemical Data, Medical Tests, and Procedures:  Hemoglobin A1C (%)   Date Value   03/22/2019 12.9 (H)   09/20/2018 9.9 (H)   09/17/2018 9.7 (H)   07/11/2018 10.6 (H)   05/21/2018 8.6 (H)      Glucose (mg/dL)    Date Value   04/10/2019 167 (H)   11/19/2018 456 (HH)   09/25/2018 122 (H)     CHOLESTEROL (mg/dL)   Date Value   04/10/2019 97      HDL (mg/dL)   Date Value   04/10/2019 37 (L)    No components found for: LDL   TRIGLYCERIDE (mg/dL)   Date Value   04/10/2019 112      CALCULATED LDL (mg/dL)   Date Value   04/10/2019 38    No results found for: MICROALBUMIN    Nutrition Diagnosis:  Inconsistent carbohydrate intake related to knowledge deficit of effect food choices have on blood glucose levels as evidenced by difficulty identifying foods that raise blood glucose and foods with neutral effect.    Nutrition Prescription:  Meal plan with  45-60 grams of carbohydrates per meal  Low fat  Reduced sodium 1500 mg per day    Intervention:  Nutrition-Related Medication Management  Medications ND-6.1: Lantus 45 units once daily, Novolog 19 units three times daily before meals    See DM Care Plan.      Nutrition Counseling:      Theoretical Basis/Approach (1)   Cognitive-Behavioral Theory   Utilizing:  Motivational interviewing, Goal setting, Self-monitoring, Problem solving and Social support      Print/Written Resources Provided:   Food Choices for Diabetes and Weight Control   DM: Adjusting your food for exercise  Novonordisk - Hypoglycemia  AVS    Monitoring and Evaluation:  Patient demonstrated basic-moderate level of knowledge in above areas of nutrition management by writing a sample meal plan and calculating the carbohydrate exchanges from food labels.  Needs review.    Plan to evaluate: I will check my blood sugars four times daily and I will perform 30-45 minutes of physical activity 3-4 times per week, per pt recall.     Recommended Follow-up:  Follow-up appointment 5/7/19 with RN and 6/4/19 with writer.  The patient was encouraged to call back if any questions or concerns.    See Patient Instructions for further information.  Thank you for your referral.  Please contact me with any question/concerns.  The MD  referral is located in EMR.    Diabetes Education Referral Expires: 3/24/20  Medical Nutrition Therapy Referral Expires: 12/31/19     Medicare hours remaining: DSMT: 0.5  MNT: 1.0

## 2021-07-18 NOTE — OB PROVIDER DELIVERY SUMMARY - NSSELHIDDEN_OBGYN_ALL_OB_FT
[NS_DeliveryAttending1_OBGYN_ALL_OB_FT:[id number]=],[NS_DeliveryAssist1_OBGYN_ALL_OB_FT:[id number]],[NS_DeliveryAttending2_OBGYN_ALL_OB_FT:[id number]]

## 2021-07-18 NOTE — OB PROVIDER TRIAGE NOTE - HISTORY OF PRESENT ILLNESS
PNC Provider:  STACIA López.  EDC:  2021.    Patient is a 29y/o  @ 39 1/7wks gest. who reports to triage with c/o contractions (q 10min) since yesterday, stronger and more consistent since 0.  Reports passing blood streaked mucus.  Denies loss of fluid.  Reports good fetal movements.    COVID-19 Status:  covid infection on 2020; not vaccinated.                  Spouse:  denies covid infection; fully vaccinated.  AP Course:  villamentous cord insertion  Meds:  pnv, & iron  NKDA    PMHx - anemia; on iron            - eczema  PSHx - lt sohoulder arthroplasty -   OBHx  - - 3/1/2013 -  6lbs 6oz  - - 2014 - 9lbs 1oz   PNC Provider:  STACIA López.  EDC:  2021.    Patient is a 31y/o  @ 39 1/7wks gest. who reports to triage with c/o contractions (q 10min) since yesterday, stronger and more consistent since 0400.  Reports passing blood streaked mucus.  Denies loss of fluid.  Reports good fetal movements.  Patient states that she signed BTL papers on 2021, but does not have her copy:  will have the procedure only if she has a CD.   Desires an IUD insertion if VD.    COVID-19 Status:  COVD-19 infection on 2020; not vaccinated.                  Spouse:  denies COVD-19 infection; fully vaccinated.  AP Course:  villamentous cord insertion  Meds:  pnv, & iron  NKDA    PMHx - anemia; on iron            - eczema  PSHx - lt shoulder arthroplasty -   OBHx  - - 3/1/2013 -  6lbs 6oz  - - 2014 - 9lbs 1oz

## 2021-07-18 NOTE — OB NEONATOLOGY/PEDIATRICIAN DELIVERY SUMMARY - NSPEDSNEONOTESA_OBGYN_ALL_OB_FT
Called by Dr. Montes to attend  vaginal delivery due to category II tracing . Baby is  product of a 39.1 week gestation born to a G 3 P 2    30 year old female   Maternal labs include Blood Type O+ , HIV -, RPR - , Hep B[ - ], GBS - on  , rest is unremarkable. Maternal history is unremarkable . Pregnancy was complicated by velamentous cord and poor fetal growth .   AROM at  0902, approximately 2 hrs.  Resuscitation included: w/d/s/s.  Apgars were: 9/9 . EOS score 0.13. Admit to  Nursery.

## 2021-07-18 NOTE — DISCHARGE NOTE OB - COMMUNITY RESOURCE CONTACT NUMBER:
Patient will call to schedule baby's first visit appointment at  [facility]: Division of General Pediatrics [address], [address] [place] [address]  ([phone]) so that baby is evaluated by pediatrician 1 to 2 days after hospital discharge.

## 2021-07-18 NOTE — DISCHARGE NOTE OB - CARE PROVIDER_API CALL
[facility],   Please f/u for postpartum appointment in 4-6 weeks @ Ambulatory Clinic Unit, [facility], Oncology/Research Building, Basement level. Please call the office for an appointment. Phone # [phone]  Phone: (   )    -  Fax: (   )    -  Follow Up Time:    STACIA CLEMENT,   Oncology [facility], [facility]  [address] [place] [address]  Phone: [phone]  Fax: (   )    -  Follow Up Time:

## 2021-07-18 NOTE — OB PROVIDER TRIAGE NOTE - NS_OBGYNHISTORY_OBGYN_ALL_OB_FT
11/10/2014 - Ft -  - 6lbs 6oz  3/1/2013 - Ft -  - 9lbs 1oz    AP course complicated by velamentous cord insertion.  ATU US from - BPP , EFW 1495g 42%, PARTHA 13

## 2021-07-18 NOTE — OB PROVIDER TRIAGE NOTE - NSOBPROVIDERNOTE_OBGYN_ALL_OB_FT
Patient is a 31y/o  @ 39 1/7wks gest. jesus manuel with a CAT 2 FHT.  GBS negative Discussed findings with Dr Garcia/Dr Montes.  Plan:  Admit to L&D for management.  Patient is a 31y/o  @ 39 1wks gest. jesus manuel with a CAT 2 FHT.  GBS negative

## 2021-07-18 NOTE — DISCHARGE NOTE OB - PLAN OF CARE
Make your follow-up appointment with your doctor as ordered. No heavy lifting, driving, or strenuous activity for 6 weeks. Nothing per vagina such as tampons, intercourse, douches or tub baths for 6 weeks or until you see your doctor. Call your doctor with any signs and symptoms of infection such as fever, chills, nausea or vomiting. Call your doctor if you’re unable to tolerate food, increase in vaginal bleeding, or have difficulty urinating. Call your doctor if you have pain that is not relieved by your prescribed medications. Notify your doctor with any other concerns.    Call [phone] if you have any of these concerns in the next 6 weeks. Recover

## 2021-07-18 NOTE — DISCHARGE NOTE OB - PATIENT PORTAL LINK FT
You can access the FollowMyHealth Patient Portal offered by [facility] by registering at the following website: [email or web address]. By joining [facility]’s FollowMyHealth portal, you will also be able to view your health information using other applications (apps) compatible with our system.

## 2021-07-19 VITALS
DIASTOLIC BLOOD PRESSURE: 61 MMHG | SYSTOLIC BLOOD PRESSURE: 125 MMHG | HEART RATE: 61 BPM | RESPIRATION RATE: 20 BRPM | OXYGEN SATURATION: 100 % | TEMPERATURE: 98 F

## 2021-07-19 RX ORDER — IBUPROFEN 200 MG
1 TABLET ORAL
Qty: 0 | Refills: 0 | DISCHARGE
Start: 2021-07-19

## 2021-07-19 RX ORDER — ACETAMINOPHEN 500 MG
3 TABLET ORAL
Qty: 0 | Refills: 0 | DISCHARGE
Start: 2021-07-19

## 2021-07-19 NOTE — PROGRESS NOTE ADULT - PROBLEM SELECTOR PLAN 1
- Continue with PO analgesia  - Increase ambulation  - Continue regular diet    Elisha Flores  PGY-1

## 2021-07-19 NOTE — PROGRESS NOTE ADULT - ASSESSMENT
31y/o  PPD#1 from normal spontaneous vaginal delivery in stable condition. 29y/o  PPD#1 from normal spontaneous vaginal delivery c/b stable L labial hematoma s/p Mirena IUD insertion in stable condition.

## 2021-07-19 NOTE — CHART NOTE - NSCHARTNOTEFT_GEN_A_CORE
R1 Progress Note    Evaluated patient at bedside during safety rounds to evaluate L labial hematoma.    Upon physical examination, external appearance of bilateral labia are normal, no hematoma appreciated upon palpation.    Patient denies vaginal pain or pressure.    Elisha Flores  PGY-1 R1 Progress Note    Evaluated patient at bedside during safety rounds to evaluate L labial hematoma.    Upon physical examination, external appearance of bilateral labia are normal, no hematoma appreciated upon palpation.    Patient denies vaginal pain or pressure.    Elisha Flores  PGY-1    Associate Chief of L & D    I agree with the above note from the PGY-1  Kaley Jo M.D., M.B.A., M.S.

## 2021-07-19 NOTE — PROGRESS NOTE ADULT - SUBJECTIVE AND OBJECTIVE BOX
Patient seen and examined at bedside, no acute overnight events. No acute complaints, pain well controlled. Patient is ambulating, voiding spontaneously, passing gas, and tolerating regular diet. Patient states that she is doing well.    Vital Signs Last 24 Hours  T(C): 36.5 (07-19-21 @ 05:42), Max: 37.3 (07-18-21 @ 10:38)  HR: 61 (07-19-21 @ 05:42) (61 - 73)  BP: 111/77 (07-19-21 @ 05:42) (104/64 - 122/72)  RR: 18 (07-19-21 @ 05:42) (16 - 20)  SpO2: 100% (07-19-21 @ 05:42) (98% - 100%)    Physical Exam:  General: NAD  Abdomen: Soft, non-tender, non-distended, fundus firm  Pelvic: Lochia wnl    Labs:    Blood Type: O Positive  Antibody Screen: Negative  RPR: Negative               11.0   5.82  )-----------( 143      ( 07-18 @ 08:30 )             33.0                11.6   6.58  )-----------( 181      ( 07-01 @ 10:12 )             34.8         MEDICATIONS  (STANDING):  acetaminophen   Tablet .. 975 milliGRAM(s) Oral <User Schedule>  ibuprofen  Tablet. 600 milliGRAM(s) Oral every 6 hours  ketorolac   Injectable 30 milliGRAM(s) IV Push once  oxytocin Infusion 333.333 milliUNIT(s)/Min (1000 mL/Hr) IV Continuous <Continuous>  oxytocin Infusion. 2 milliUNIT(s)/Min (2 mL/Hr) IV Continuous <Continuous>    MEDICATIONS  (PRN):  oxyCODONE    IR 5 milliGRAM(s) Oral every 3 hours PRN Moderate to Severe Pain (4-10)  oxyCODONE    IR 5 milliGRAM(s) Oral once PRN Moderate to Severe Pain (4-10)

## 2021-07-19 NOTE — PROGRESS NOTE ADULT - ATTENDING COMMENTS
Associate Chief of L & D (Late entry)     I have not met this patient before today.  She was admitted and delivered by Dr Giles     OB Progress Note:  PPD#1    S: 29yo  PPD#1 s/p . Patient denies any complaints at this time     Vitals:  Vital Signs Last 24 Hrs  T(C): 36.5 (2021 05:42), Max: 37 (2021 11:42)  T(F): 97.7 (2021 05:42), Max: 98.6 (2021 11:42)  HR: 61 (2021 05:42) (61 - 73)  BP: 111/77 (2021 05:42) (104/64 - 121/67)  BP(mean): 85 (2021 05:42) (85 - 85)  RR: 18 (2021 05:42) (16 - 18)  SpO2: 100% (2021 05:42) (98% - 100%)    MEDICATIONS  (STANDING):  acetaminophen   Tablet .. 975 milliGRAM(s) Oral <User Schedule>  ibuprofen  Tablet. 600 milliGRAM(s) Oral every 6 hours  ketorolac   Injectable 30 milliGRAM(s) IV Push once  oxytocin Infusion 333.333 milliUNIT(s)/Min (1000 mL/Hr) IV Continuous <Continuous>  oxytocin Infusion. 2 milliUNIT(s)/Min (2 mL/Hr) IV Continuous <Continuous>      Labs:  Blood type: O Positive  Rubella IgG: RPR: Negative                          11.0<L>   5.82 >-----------< 143<L>    ( 18 @ 08:30 )             33.0<L>        Physical Exam:    Abdomen: soft, fundus firm, NT, ND  Vaginal: Lochia scant no evidence oh hematoma noted   Extremities: No erythema/ trace edema    A/P: 29yo PPD#1 s/p  and placement of Mirena IUD and labial hematoma which has resolved    - Pain well controlled, continue current pain regimen  - Increase ambulation, SCDs when not ambulating  - Continue regular diet    Kaley Jo M.D., M.B.A., M.S. Associate Chief of L & D (Late entry)     I have not met this patient before today.  She was admitted and delivered by Dr Giles     OB Progress Note:  PPD#1    S: 29yo  PPD#1 s/p . Patient denies any complaints at this time     Vitals:  Vital Signs Last 24 Hrs  T(C): 36.5 (2021 05:42), Max: 37 (2021 11:42)  T(F): 97.7 (2021 05:42), Max: 98.6 (2021 11:42)  HR: 61 (2021 05:42) (61 - 73)  BP: 111/77 (2021 05:42) (104/64 - 121/67)  BP(mean): 85 (2021 05:42) (85 - 85)  RR: 18 (2021 05:42) (16 - 18)  SpO2: 100% (2021 05:42) (98% - 100%)    MEDICATIONS  (STANDING):  acetaminophen   Tablet .. 975 milliGRAM(s) Oral <User Schedule>  ibuprofen  Tablet. 600 milliGRAM(s) Oral every 6 hours  ketorolac   Injectable 30 milliGRAM(s) IV Push once  oxytocin Infusion 333.333 milliUNIT(s)/Min (1000 mL/Hr) IV Continuous <Continuous>  oxytocin Infusion. 2 milliUNIT(s)/Min (2 mL/Hr) IV Continuous <Continuous>      Labs:  Blood type: O Positive  Rubella IgG: RPR: Negative                          11.0<L>   5.82 >-----------< 143<L>    ( 18 @ 08:30 )             33.0<L>        Physical Exam:    Abdomen: soft, fundus firm, NT, ND  Vaginal: Lochia scant no evidence oh hematoma noted   Extremities: No erythema/ trace edema    A/P: 29yo PPD#1 s/p  and placement of Mirena IUD and labial hematoma which has resolved    - Patient is stable for discharge and will follow up in the  clinic    Kaley Jo M.D., M.B.A., M.S.

## 2021-07-21 ENCOUNTER — APPOINTMENT (OUTPATIENT)
Dept: OBGYN | Facility: HOSPITAL | Age: 31
End: 2021-07-21

## 2021-07-28 ENCOUNTER — APPOINTMENT (OUTPATIENT)
Dept: OBGYN | Facility: HOSPITAL | Age: 31
End: 2021-07-28

## 2021-07-28 ENCOUNTER — OUTPATIENT (OUTPATIENT)
Dept: OUTPATIENT SERVICES | Facility: HOSPITAL | Age: 31
LOS: 1 days | End: 2021-07-28

## 2021-07-28 DIAGNOSIS — Z96.612 PRESENCE OF LEFT ARTIFICIAL SHOULDER JOINT: Chronic | ICD-10-CM

## 2021-07-28 DIAGNOSIS — Z30.432 ENCOUNTER FOR REMOVAL OF INTRAUTERINE CONTRACEPTIVE DEVICE: ICD-10-CM

## 2021-07-28 NOTE — HISTORY OF PRESENT ILLNESS
[FreeTextEntry1] : 31 yo  s/p  21 with mirena insertion presents for long mirena strings. She is doing well, states her son Morales is also doing well. Noticed some cramping for the last 2 days and when she wiped the strings were at the entrance to the vagina. Still having moderate lochia. \par Is still planning on having BTL, states if IUD needs to be removed she will likely just schedule BTL and not replace IUD.

## 2021-07-28 NOTE — PROCEDURE
[IUD Removal] : intrauterine device (IUD) removal [Time out performed] : Pre-procedure time out performed.  Patient's name, date of birth and procedure confirmed. [Consent Obtained] : Consent obtained [Risks] : risks [Benefits] : benefits [Alternatives] : alternatives [Patient] : patient [Speculum Placed] : speculum placed [IUD Removed - Forceps] : IUD removed - forceps [IUD Discarded] : IUD discarded [Tolerated Well] : Patient tolerated the procedure well [de-identified] : expelled IUD

## 2021-07-28 NOTE — PHYSICAL EXAM
[Appropriately responsive] : appropriately responsive [Alert] : alert [No Acute Distress] : no acute distress [No Lymphadenopathy] : no lymphadenopathy [Regular Rate Rhythm] : regular rate rhythm [No Murmurs] : no murmurs [Clear to Auscultation B/L] : clear to auscultation bilaterally [Soft] : soft [Non-tender] : non-tender [Non-distended] : non-distended [No HSM] : No HSM [No Lesions] : no lesions [No Mass] : no mass [Oriented x3] : oriented x3 [Labia Majora] : normal [Labia Minora] : normal [Normal] : normal [Uterine Adnexae] : normal [FreeTextEntry5] : strings approx 8 cm long

## 2021-07-28 NOTE — DISCUSSION/SUMMARY
[FreeTextEntry1] : 31 yo  s/p  21 with mirena insertion presents with expelled IUD. \par \par 1. IUD\par -noted to be in cervix on transvaginal sono\par -removed without incident\par -pt to make surgical booking clinic when she is ready for BTL planning, explained can replace IUD after PP appt if she wishes to hold off on BTL, pt to consider options\par \par RTC full PP 5 weeks

## 2021-08-12 ENCOUNTER — NON-APPOINTMENT (OUTPATIENT)
Age: 31
End: 2021-08-12

## 2021-08-31 ENCOUNTER — APPOINTMENT (OUTPATIENT)
Dept: OBGYN | Facility: HOSPITAL | Age: 31
End: 2021-08-31

## 2021-08-31 ENCOUNTER — OUTPATIENT (OUTPATIENT)
Dept: OUTPATIENT SERVICES | Facility: HOSPITAL | Age: 31
LOS: 1 days | End: 2021-08-31

## 2021-08-31 VITALS
DIASTOLIC BLOOD PRESSURE: 56 MMHG | HEIGHT: 67 IN | BODY MASS INDEX: 27.62 KG/M2 | TEMPERATURE: 98 F | SYSTOLIC BLOOD PRESSURE: 108 MMHG | WEIGHT: 176 LBS | HEART RATE: 67 BPM

## 2021-08-31 DIAGNOSIS — Z96.612 PRESENCE OF LEFT ARTIFICIAL SHOULDER JOINT: Chronic | ICD-10-CM

## 2021-08-31 NOTE — HISTORY OF PRESENT ILLNESS
[Postpartum Follow Up] : postpartum follow up [Delivery Date: ___] : on [unfilled] [] : delivered by vaginal delivery [Male] : Delivery History: baby boy [Wt. ___] : weighing [unfilled] [Pertussis Vaccine] : Pertussis vaccine administered [Discharge HCT: ___] : hematocrit level was [unfilled] [Discharge HGB: ___] : hemoglobin level was [unfilled] [Resumed Menses] : has resumed her menses [Intended Contraception] : Intended Contraception: [Tubal Ligation] : tubal ligation [Back to Normal] : is back to normal in size [None] : No associated symptoms are reported [Moderate] : moderate vaginal bleeding [Normal] : the vagina was normal [Not Done] : Examination of breasts not done [Complications:___] : no complications [Rhogam] : Rhogam was not administered [Rubella Vaccine] : Rubella vaccine was not administered [BTL] : no tubal ligation [Breastfeeding] : not currently nursing [Resumed Maeser] : has not resumed intercourse [Cervix Sample Taken] : cervical sample not taken for a Pap smear [de-identified] : Mom and baby doing well. Consent signed for BTL, booking clinic appt made for patient. Corrina to contact pt to schedule. Feels happy with baby, SW to call patient.  [de-identified] : Booking clinic for BTL. Otherwise RTC 1 year or prn

## 2021-09-15 ENCOUNTER — APPOINTMENT (OUTPATIENT)
Dept: OBGYN | Facility: HOSPITAL | Age: 31
End: 2021-09-15
Payer: MEDICAID

## 2021-09-15 ENCOUNTER — OUTPATIENT (OUTPATIENT)
Dept: OUTPATIENT SERVICES | Facility: HOSPITAL | Age: 31
LOS: 1 days | End: 2021-09-15

## 2021-09-15 VITALS
DIASTOLIC BLOOD PRESSURE: 58 MMHG | WEIGHT: 175 LBS | BODY MASS INDEX: 27.47 KG/M2 | HEART RATE: 69 BPM | HEIGHT: 67 IN | SYSTOLIC BLOOD PRESSURE: 111 MMHG | TEMPERATURE: 98.1 F

## 2021-09-15 DIAGNOSIS — Z30.09 ENCOUNTER FOR OTHER GENERAL COUNSELING AND ADVICE ON CONTRACEPTION: ICD-10-CM

## 2021-09-15 DIAGNOSIS — Z96.612 PRESENCE OF LEFT ARTIFICIAL SHOULDER JOINT: Chronic | ICD-10-CM

## 2021-09-15 PROCEDURE — 99213 OFFICE O/P EST LOW 20 MIN: CPT | Mod: GC

## 2021-09-15 RX ORDER — HYDROCORTISONE 25 MG/G
2.5 CREAM TOPICAL TWICE DAILY
Qty: 1 | Refills: 0 | Status: DISCONTINUED | COMMUNITY
Start: 2021-02-25 | End: 2021-09-15

## 2021-09-15 RX ORDER — LORATADINE 10 MG
17 TABLET,DISINTEGRATING ORAL DAILY
Qty: 1 | Refills: 1 | Status: DISCONTINUED | COMMUNITY
Start: 2020-12-22 | End: 2021-09-15

## 2021-09-15 RX ORDER — MULTIVIT-MIN/IRON/FOLIC ACID/K 18-600-40
50 MCG CAPSULE ORAL
Qty: 30 | Refills: 2 | Status: DISCONTINUED | COMMUNITY
Start: 2020-12-24 | End: 2021-09-15

## 2021-09-15 RX ORDER — FERROUS SULFATE 325(65) MG
325 (65 FE) TABLET ORAL TWICE DAILY
Qty: 120 | Refills: 5 | Status: DISCONTINUED | COMMUNITY
Start: 2021-04-28 | End: 2021-09-15

## 2021-09-16 DIAGNOSIS — Z30.2 ENCOUNTER FOR STERILIZATION: ICD-10-CM

## 2021-09-22 NOTE — HISTORY OF PRESENT ILLNESS
[Patient reported PAP Smear was normal] : Patient reported PAP Smear was normal [FreeTextEntry1] : 31 y.o.  LMP 2021 presenting to booking clinic for desire for surgical sterilization. Patient reports that she desires permanent birth control option following her most recent delivery in July. PP Mirena initially was placed day of delivery but was expulsed. Since then she has been using condoms for contraception. She denies any fevers, chills, abdominal pain, NVD \par \par OBHx: \par -  x3 \par Gyn: denies any ovarian cysts, fibroids, abnl pap smears, STIs \par HCM: \par - pap 2020: NILM \par PMHx: denies \par Meds: denies \par SHx: denies \par FMHx: \par - Maternal grandmother -> cervical CA \par - Maternal aunt -> gastric CA \par - Paternal uncle -> metastatic cancer unknown primary site (in lungs, brain, stomach)\par Psych: denies anxiety or depression \par Social: social alcohol use. Denies any tobacco or illicit substance use \par All: NKDA  [PapSmeardate] : 12/20

## 2021-10-04 ENCOUNTER — NON-APPOINTMENT (OUTPATIENT)
Age: 31
End: 2021-10-04

## 2021-11-27 NOTE — OB RN DELIVERY SUMMARY - NS_FETALMONITOR_OBGYN_ALL_OB
"  Occupational Therapy  Visit Type: initial evaluation  Precautions:  Medical precautions: ; standard precautions. Collaborated with RN Hazel for session. 11/26: xray, left great toe distal phalanx non-displaced fracture, no WB limitations in chart. Lines:     Basic: IV, O2, telemetry and continuous pulse oximetry (optiflow)      Lines in chart and on patient reviewed, precautions maintained throughout session. Vision:     Current vision: wears glasses all the time  Safety Measures: call light available. SUBJECTIVE  Patient agreed to participate in therapy this date. ""I need to lay down, but I have to poop first!\""  Patient / Family Goal: return to previous functional status    Pain   RN informed on pain level    OBJECTIVE   Level of consciousness: alert    Oriented to person, place, time and situation     Arousal alertness: appropriate responses to stimuli    Affect/Behavior: cooperative, alert, pleasant and distractable  Patient activity tolerance: 1 to 1 activity to rest  Functional Communication/Cognition    Overall status:  Impaired    Form of communication:  Verbal     Attention span:  Attends with cues to redirect    Attention Span Impairment: reduced memory, tangential behaviors and distractibility    Commands: follows one step commands with increased time and follows one step commands with repetition. Transition between tasks: transition with cues. Safety judgement: decreased awareness of need for assistance. Awareness of deficits: decreased awareness of deficits. Hand Dominance: right  Upper Extremity Function: Left: functional  Right: functional  Range of Motion (measured in degrees unless otherwise indicated)  WFL: LUE RUE  ROM Details: BUE WFL except shoulders limited to ~75-85 degrees bilaterally, reports baseline.   Strength (out of 5 unless otherwise indicated)  WFL: LUE, RUE  Balance (trials in sec unless otherwise indicated)  Sitting:   - Static:  supervision    - Dynamic:  " "stand by assist  Standing - Firm Surface - Eyes Open:    - Static: minimal assist   - Dynamic:  minimal assist    Bed mobility:    Rolling left: minimal assist    Rolling right: minimal assist    Repositioning in bed: moderate assist and 2 persons    Sit to supine: moderate assist and with verbal cues  Training completed:    Tasks: sit to supine    Education details: body mechanics and patient safety  Transfers:    Assistive devices: gait belt (no DME used to increase proximity of assist)    Sit to stand: minimal assist    Stand to sit: minimal assist   Bed to chair: minimal assist, type:   Chair to bed: moderate assist, type:    Training completed:    Tasks: sit to stand, stand to sit and toilet    Education details: body mechanics and patient safety    Pt transferred from chair to commode with min A via stand/pivot transfer. Pt ambulated 3 ft from commode to bed with heavy mod A with no device to increase proximity of assistance required due to progressing weakness from extended commode use (successful BM) and generalized weakness. Reports her RLE feeling ""heavy like a weight. \""  Activities of Daily Living (ADLs):  Upper Body Dressing:    Assist: moderate assist    Position: chair    Assist needed for: thread left upper extermity, thread right upper extermity, pull down in back, increased time to complete and pull around back (limited by multiple ICU lines)  Lower Body Dressing:      Footwear assistance: total assist - dependent     Footwear position: chair    Assist needed for: don/doff right sock and don/doff left sock  Toilet transfer:     Assist: minimal assist    Device: gait belt    Equipment: bedside commode  Toileting:     Assist: total assist - dependent  (Min A x1 for balance + Total A for pericares)    Assist needed for: steadying, clothing management down, clothing management up, perineal hygiene, increased time to complete and supervision/safety             ASSESSMENT    Impairments: activity tolerance, " "balance, strength, safety awareness, cognitive, body habitus and decreased insight into deficits  Functional Limitations: grooming, bathing, toileting, functional transfers, IADLs, showering, dressing, job performance and participating in [facility] Affected: bathing/showering, functional mobility/transfers, personal hygiene/grooming, toileting/toilet hygiene, upper body dressing, lower body dressing    [facility] is a 64yo F admitted from home for COPD exacerbation, tracheobronchitis, and a fall at home. Per patient and chart review, patient lives with boyfriend, his adult son, and their 2yo grandson, and is overall mod I with all ADL (except intermittent min A for donning socks/shoes), family completes IADL. Patient reports not using DME at baseline reporting ""our apartment is too small\"" however furniture walks. Upon eval, patient requires max/total A for ADL cares and min/mod A for functional transfers and short distance ambulation. Limited by generalized weakness, cognition (hyperverbal, tangential, distracted, forgetful, decreased insight to deficits), and decreased standing balance and tolerance. Increased time required as patient continent of BM on commode this date with vitals stable on optiflow, except intermittent tachycardia at times due to strain with BM. (RN present and aware). Pt would benefit from skilled OT to address deficits listed above in order to maximize independence.       Recommended Consults: OT: PT  Discharge Recommendations: TBD  Recommendations for Discharge: OT WI: Post acute therapy      PT/OT Mobility Equipment for Discharge: pt has 4ww and cane, no other needs  PT/OT ADL Equipment for Discharge: Pt has tub shower, shower chair (will assess need for TTB), standard toilet  OT Identified Barriers to Discharge: medical, weakness, cognition     Skilled therapy is required to address these limitations in attempt to maximize the patient's " independence. Clinical decision making: Moderate - Patient has several limitations (3-5), comorbidities and/or complexities, as noted in detailed assessment above, that impact their occupational profile. Resulting in several treatment options and minimal to moderate task modification consistent with moderate clinical decision making complexity. End of Session:   Location: in bed  Safety measures: alarm system in place/re-engaged  Handoff to: nurse and therapist  Education Provided:   Learning assessment:  - Primary learner: patient  - Are they ready to learn: yes  - Preferred learning style: verbal and demonstration  - Barriers to learning: cognitive  Education provided during session:  - Receiving education: patient  - Results of above outlined education: Verbalizes understanding and Needs reinforcement    PLAN  Suggestions for next session as indicated: Progressing ADL in sitting, commode transfer/toileting, standing balance/tolerance    Frequency Comments: X, M-F 11/27        Interventions: activity tolerance training, ADL retraining, balance, bed mobility training, transfer training, therapeutic activity, therapeutic exercise, patient education and patient/family training  Agreement to plan and goals: patient agrees with goals and treatment plan      GOALS  Long Term Goals: (to be met by time of discharge from hospital)  Grooming: Patient will complete grooming tasks modified independent. Upper body dressing: Patient will complete upper body dressing modified independent. Lower body dressing: Patient will complete lower body dressing minimal assist.  Toileting: Patient will complete toileting modified independent. Bathing: Patient will complete bathingsupervision Toilet transfer: Patient will complete toilet transfer with modified independent. Documented in the chart in the following areas: Prior Level of Function. Pain. Assessment. Plan.       Therapy procedure time and total treatment time can be found documented on the Time Entry flowsheet External [place]/External FHR

## 2021-12-08 ENCOUNTER — RESULT REVIEW (OUTPATIENT)
Age: 31
End: 2021-12-08

## 2022-01-25 ENCOUNTER — APPOINTMENT (OUTPATIENT)
Dept: ANTEPARTUM | Facility: CLINIC | Age: 32
End: 2022-01-25
Payer: MEDICAID

## 2022-01-25 ENCOUNTER — ASOB RESULT (OUTPATIENT)
Age: 32
End: 2022-01-25

## 2022-01-25 PROBLEM — Z00.00 ENCOUNTER FOR PREVENTIVE HEALTH EXAMINATION: Noted: 2022-01-25

## 2022-01-25 PROCEDURE — 76801 OB US < 14 WKS SINGLE FETUS: CPT

## 2022-01-25 PROCEDURE — 76813 OB US NUCHAL MEAS 1 GEST: CPT | Mod: 59

## 2022-03-07 ENCOUNTER — TRANSCRIPTION ENCOUNTER (OUTPATIENT)
Age: 32
End: 2022-03-07

## 2022-03-21 ENCOUNTER — APPOINTMENT (OUTPATIENT)
Dept: ANTEPARTUM | Facility: CLINIC | Age: 32
End: 2022-03-21

## 2022-03-22 ENCOUNTER — APPOINTMENT (OUTPATIENT)
Dept: ANTEPARTUM | Facility: CLINIC | Age: 32
End: 2022-03-22
Payer: MEDICAID

## 2022-03-22 ENCOUNTER — ASOB RESULT (OUTPATIENT)
Age: 32
End: 2022-03-22

## 2022-03-22 PROCEDURE — 76805 OB US >/= 14 WKS SNGL FETUS: CPT

## 2022-04-06 ENCOUNTER — APPOINTMENT (OUTPATIENT)
Dept: ANTEPARTUM | Facility: CLINIC | Age: 32
End: 2022-04-06
Payer: MEDICAID

## 2022-04-06 ENCOUNTER — ASOB RESULT (OUTPATIENT)
Age: 32
End: 2022-04-06

## 2022-04-06 PROCEDURE — 76816 OB US FOLLOW-UP PER FETUS: CPT

## 2022-07-29 ENCOUNTER — INPATIENT (INPATIENT)
Facility: HOSPITAL | Age: 32
LOS: 1 days | Discharge: HOME | End: 2022-07-31
Attending: SPECIALIST | Admitting: SPECIALIST

## 2022-07-29 ENCOUNTER — TRANSCRIPTION ENCOUNTER (OUTPATIENT)
Age: 32
End: 2022-07-29

## 2022-07-29 VITALS
OXYGEN SATURATION: 100 % | TEMPERATURE: 98 F | HEART RATE: 79 BPM | RESPIRATION RATE: 16 BRPM | DIASTOLIC BLOOD PRESSURE: 59 MMHG | SYSTOLIC BLOOD PRESSURE: 100 MMHG

## 2022-07-29 DIAGNOSIS — O26.899 OTHER SPECIFIED PREGNANCY RELATED CONDITIONS, UNSPECIFIED TRIMESTER: ICD-10-CM

## 2022-07-29 DIAGNOSIS — Z96.612 PRESENCE OF LEFT ARTIFICIAL SHOULDER JOINT: Chronic | ICD-10-CM

## 2022-07-29 DIAGNOSIS — Z3A.00 WEEKS OF GESTATION OF PREGNANCY NOT SPECIFIED: ICD-10-CM

## 2022-07-29 LAB
ALBUMIN SERPL ELPH-MCNC: 2.5 G/DL — LOW (ref 3.3–5)
ALP SERPL-CCNC: 159 U/L — HIGH (ref 40–120)
ALT FLD-CCNC: 26 U/L — SIGNIFICANT CHANGE UP (ref 4–33)
ANION GAP SERPL CALC-SCNC: 8 MMOL/L — SIGNIFICANT CHANGE UP (ref 7–14)
APTT BLD: 30.6 SEC — SIGNIFICANT CHANGE UP (ref 27–36.3)
APTT BLD: 49.6 SEC — HIGH (ref 27–36.3)
AST SERPL-CCNC: 21 U/L — SIGNIFICANT CHANGE UP (ref 4–32)
BASOPHILS # BLD AUTO: 0.02 K/UL — SIGNIFICANT CHANGE UP (ref 0–0.2)
BASOPHILS NFR BLD AUTO: 0.3 % — SIGNIFICANT CHANGE UP (ref 0–2)
BILIRUB SERPL-MCNC: 0.6 MG/DL — SIGNIFICANT CHANGE UP (ref 0.2–1.2)
BLD GP AB SCN SERPL QL: NEGATIVE — SIGNIFICANT CHANGE UP
BUN SERPL-MCNC: 9 MG/DL — SIGNIFICANT CHANGE UP (ref 7–23)
CALCIUM SERPL-MCNC: 6.8 MG/DL — LOW (ref 8.4–10.5)
CHLORIDE SERPL-SCNC: 109 MMOL/L — HIGH (ref 98–107)
CO2 SERPL-SCNC: 19 MMOL/L — LOW (ref 22–31)
COVID-19 SPIKE DOMAIN AB INTERP: POSITIVE
COVID-19 SPIKE DOMAIN ANTIBODY RESULT: >250 U/ML — HIGH
CREAT SERPL-MCNC: 0.41 MG/DL — LOW (ref 0.5–1.3)
EGFR: 136 ML/MIN/1.73M2 — SIGNIFICANT CHANGE UP
EOSINOPHIL # BLD AUTO: 0.07 K/UL — SIGNIFICANT CHANGE UP (ref 0–0.5)
EOSINOPHIL NFR BLD AUTO: 0.9 % — SIGNIFICANT CHANGE UP (ref 0–6)
FIBRINOGEN PPP-MCNC: 211 MG/DL — LOW (ref 330–520)
FIBRINOGEN PPP-MCNC: 492 MG/DL — SIGNIFICANT CHANGE UP (ref 330–520)
GLUCOSE SERPL-MCNC: 67 MG/DL — LOW (ref 70–99)
HCT VFR BLD CALC: 21 % — CRITICAL LOW (ref 34.5–45)
HCT VFR BLD CALC: 31.2 % — LOW (ref 34.5–45)
HGB BLD-MCNC: 6.6 G/DL — CRITICAL LOW (ref 11.5–15.5)
HGB BLD-MCNC: 9.9 G/DL — LOW (ref 11.5–15.5)
IANC: 5.64 K/UL — SIGNIFICANT CHANGE UP (ref 1.8–7.4)
IMM GRANULOCYTES NFR BLD AUTO: 0.4 % — SIGNIFICANT CHANGE UP (ref 0–1.5)
INR BLD: 1.04 RATIO — SIGNIFICANT CHANGE UP (ref 0.88–1.16)
INR BLD: 1.72 RATIO — HIGH (ref 0.88–1.16)
LYMPHOCYTES # BLD AUTO: 1.48 K/UL — SIGNIFICANT CHANGE UP (ref 1–3.3)
LYMPHOCYTES # BLD AUTO: 19.2 % — SIGNIFICANT CHANGE UP (ref 13–44)
MCHC RBC-ENTMCNC: 26.3 PG — LOW (ref 27–34)
MCHC RBC-ENTMCNC: 26.9 PG — LOW (ref 27–34)
MCHC RBC-ENTMCNC: 31.4 GM/DL — LOW (ref 32–36)
MCHC RBC-ENTMCNC: 31.7 GM/DL — LOW (ref 32–36)
MCV RBC AUTO: 82.8 FL — SIGNIFICANT CHANGE UP (ref 80–100)
MCV RBC AUTO: 85.7 FL — SIGNIFICANT CHANGE UP (ref 80–100)
MONOCYTES # BLD AUTO: 0.46 K/UL — SIGNIFICANT CHANGE UP (ref 0–0.9)
MONOCYTES NFR BLD AUTO: 6 % — SIGNIFICANT CHANGE UP (ref 2–14)
NEUTROPHILS # BLD AUTO: 5.64 K/UL — SIGNIFICANT CHANGE UP (ref 1.8–7.4)
NEUTROPHILS NFR BLD AUTO: 73.2 % — SIGNIFICANT CHANGE UP (ref 43–77)
NRBC # BLD: 0 /100 WBCS — SIGNIFICANT CHANGE UP
NRBC # BLD: 0 /100 WBCS — SIGNIFICANT CHANGE UP
NRBC # FLD: 0 K/UL — SIGNIFICANT CHANGE UP
NRBC # FLD: 0 K/UL — SIGNIFICANT CHANGE UP
PLATELET # BLD AUTO: 156 K/UL — SIGNIFICANT CHANGE UP (ref 150–400)
PLATELET # BLD AUTO: 213 K/UL — SIGNIFICANT CHANGE UP (ref 150–400)
POTASSIUM SERPL-MCNC: 3.3 MMOL/L — LOW (ref 3.5–5.3)
POTASSIUM SERPL-SCNC: 3.3 MMOL/L — LOW (ref 3.5–5.3)
PROT SERPL-MCNC: 5 G/DL — LOW (ref 6–8.3)
PROTHROM AB SERPL-ACNC: 12.1 SEC — SIGNIFICANT CHANGE UP (ref 10.5–13.4)
PROTHROM AB SERPL-ACNC: 20.1 SEC — HIGH (ref 10.5–13.4)
RBC # BLD: 2.45 M/UL — LOW (ref 3.8–5.2)
RBC # BLD: 3.77 M/UL — LOW (ref 3.8–5.2)
RBC # FLD: 13.5 % — SIGNIFICANT CHANGE UP (ref 10.3–14.5)
RBC # FLD: 13.6 % — SIGNIFICANT CHANGE UP (ref 10.3–14.5)
RH IG SCN BLD-IMP: POSITIVE — SIGNIFICANT CHANGE UP
SARS-COV-2 IGG+IGM SERPL QL IA: >250 U/ML — HIGH
SARS-COV-2 IGG+IGM SERPL QL IA: POSITIVE
SARS-COV-2 RNA SPEC QL NAA+PROBE: SIGNIFICANT CHANGE UP
SODIUM SERPL-SCNC: 136 MMOL/L — SIGNIFICANT CHANGE UP (ref 135–145)
T PALLIDUM AB TITR SER: NEGATIVE — SIGNIFICANT CHANGE UP
WBC # BLD: 7.7 K/UL — SIGNIFICANT CHANGE UP (ref 3.8–10.5)
WBC # BLD: 9.8 K/UL — SIGNIFICANT CHANGE UP (ref 3.8–10.5)
WBC # FLD AUTO: 7.7 K/UL — SIGNIFICANT CHANGE UP (ref 3.8–10.5)
WBC # FLD AUTO: 9.8 K/UL — SIGNIFICANT CHANGE UP (ref 3.8–10.5)

## 2022-07-29 RX ORDER — DIPHENHYDRAMINE HCL 50 MG
25 CAPSULE ORAL ONCE
Refills: 0 | Status: COMPLETED | OUTPATIENT
Start: 2022-07-29 | End: 2022-07-29

## 2022-07-29 RX ORDER — TRANEXAMIC ACID 100 MG/ML
1000 INJECTION, SOLUTION INTRAVENOUS ONCE
Refills: 0 | Status: COMPLETED | OUTPATIENT
Start: 2022-07-29 | End: 2022-07-29

## 2022-07-29 RX ORDER — IBUPROFEN 200 MG
600 TABLET ORAL EVERY 6 HOURS
Refills: 0 | Status: DISCONTINUED | OUTPATIENT
Start: 2022-07-29 | End: 2022-07-31

## 2022-07-29 RX ORDER — ACETAMINOPHEN 500 MG
975 TABLET ORAL
Refills: 0 | Status: DISCONTINUED | OUTPATIENT
Start: 2022-07-29 | End: 2022-07-29

## 2022-07-29 RX ORDER — SODIUM CHLORIDE 9 MG/ML
1000 INJECTION, SOLUTION INTRAVENOUS
Refills: 0 | Status: DISCONTINUED | OUTPATIENT
Start: 2022-07-29 | End: 2022-07-29

## 2022-07-29 RX ORDER — SODIUM CHLORIDE 9 MG/ML
3 INJECTION INTRAMUSCULAR; INTRAVENOUS; SUBCUTANEOUS EVERY 8 HOURS
Refills: 0 | Status: DISCONTINUED | OUTPATIENT
Start: 2022-07-29 | End: 2022-07-31

## 2022-07-29 RX ORDER — BENZOYL PEROXIDE MICRONIZED 5.8 %
1 TOWELETTE (EA) TOPICAL
Qty: 0 | Refills: 0 | DISCHARGE

## 2022-07-29 RX ORDER — SODIUM CHLORIDE 9 MG/ML
500 INJECTION, SOLUTION INTRAVENOUS ONCE
Refills: 0 | Status: COMPLETED | OUTPATIENT
Start: 2022-07-29 | End: 2022-07-29

## 2022-07-29 RX ORDER — AMPICILLIN TRIHYDRATE 250 MG
1 CAPSULE ORAL EVERY 4 HOURS
Refills: 0 | Status: DISCONTINUED | OUTPATIENT
Start: 2022-07-29 | End: 2022-07-29

## 2022-07-29 RX ORDER — ERTAPENEM SODIUM 1 G/1
1000 INJECTION, POWDER, LYOPHILIZED, FOR SOLUTION INTRAMUSCULAR; INTRAVENOUS ONCE
Refills: 0 | Status: COMPLETED | OUTPATIENT
Start: 2022-07-29 | End: 2022-07-29

## 2022-07-29 RX ORDER — ACETAMINOPHEN 500 MG
975 TABLET ORAL ONCE
Refills: 0 | Status: DISCONTINUED | OUTPATIENT
Start: 2022-07-29 | End: 2022-07-29

## 2022-07-29 RX ORDER — AMPICILLIN TRIHYDRATE 250 MG
2 CAPSULE ORAL ONCE
Refills: 0 | Status: COMPLETED | OUTPATIENT
Start: 2022-07-29 | End: 2022-07-29

## 2022-07-29 RX ORDER — OXYTOCIN 10 UNIT/ML
40 VIAL (ML) INJECTION ONCE
Refills: 0 | Status: COMPLETED | OUTPATIENT
Start: 2022-07-29 | End: 2022-07-29

## 2022-07-29 RX ORDER — OXYTOCIN 10 UNIT/ML
333.33 VIAL (ML) INJECTION
Qty: 20 | Refills: 0 | Status: DISCONTINUED | OUTPATIENT
Start: 2022-07-29 | End: 2022-07-29

## 2022-07-29 RX ORDER — TETANUS TOXOID, REDUCED DIPHTHERIA TOXOID AND ACELLULAR PERTUSSIS VACCINE, ADSORBED 5; 2.5; 8; 8; 2.5 [IU]/.5ML; [IU]/.5ML; UG/.5ML; UG/.5ML; UG/.5ML
0.5 SUSPENSION INTRAMUSCULAR ONCE
Refills: 0 | Status: DISCONTINUED | OUTPATIENT
Start: 2022-07-29 | End: 2022-07-31

## 2022-07-29 RX ORDER — AER TRAVELER 0.5 G/1
1 SOLUTION RECTAL; TOPICAL EVERY 4 HOURS
Refills: 0 | Status: DISCONTINUED | OUTPATIENT
Start: 2022-07-29 | End: 2022-07-31

## 2022-07-29 RX ORDER — SIMETHICONE 80 MG/1
80 TABLET, CHEWABLE ORAL EVERY 4 HOURS
Refills: 0 | Status: DISCONTINUED | OUTPATIENT
Start: 2022-07-29 | End: 2022-07-31

## 2022-07-29 RX ORDER — PRAMOXINE HYDROCHLORIDE 150 MG/15G
1 AEROSOL, FOAM RECTAL EVERY 4 HOURS
Refills: 0 | Status: DISCONTINUED | OUTPATIENT
Start: 2022-07-29 | End: 2022-07-31

## 2022-07-29 RX ORDER — IBUPROFEN 200 MG
600 TABLET ORAL EVERY 6 HOURS
Refills: 0 | Status: COMPLETED | OUTPATIENT
Start: 2022-07-29 | End: 2023-06-27

## 2022-07-29 RX ORDER — ACETAMINOPHEN 500 MG
975 TABLET ORAL EVERY 6 HOURS
Refills: 0 | Status: DISCONTINUED | OUTPATIENT
Start: 2022-07-29 | End: 2022-07-29

## 2022-07-29 RX ORDER — DIPHENHYDRAMINE HCL 50 MG
25 CAPSULE ORAL EVERY 6 HOURS
Refills: 0 | Status: DISCONTINUED | OUTPATIENT
Start: 2022-07-29 | End: 2022-07-31

## 2022-07-29 RX ORDER — BENZOCAINE 10 %
1 GEL (GRAM) MUCOUS MEMBRANE EVERY 6 HOURS
Refills: 0 | Status: DISCONTINUED | OUTPATIENT
Start: 2022-07-29 | End: 2022-07-31

## 2022-07-29 RX ORDER — DIBUCAINE 1 %
1 OINTMENT (GRAM) RECTAL EVERY 6 HOURS
Refills: 0 | Status: DISCONTINUED | OUTPATIENT
Start: 2022-07-29 | End: 2022-07-31

## 2022-07-29 RX ORDER — ACETAMINOPHEN 500 MG
975 TABLET ORAL
Refills: 0 | Status: DISCONTINUED | OUTPATIENT
Start: 2022-07-29 | End: 2022-07-31

## 2022-07-29 RX ORDER — OXYCODONE HYDROCHLORIDE 5 MG/1
5 TABLET ORAL
Refills: 0 | Status: DISCONTINUED | OUTPATIENT
Start: 2022-07-29 | End: 2022-07-31

## 2022-07-29 RX ORDER — DIPHENHYDRAMINE HCL 50 MG
25 CAPSULE ORAL ONCE
Refills: 0 | Status: DISCONTINUED | OUTPATIENT
Start: 2022-07-29 | End: 2022-07-29

## 2022-07-29 RX ORDER — LANOLIN
1 OINTMENT (GRAM) TOPICAL EVERY 6 HOURS
Refills: 0 | Status: DISCONTINUED | OUTPATIENT
Start: 2022-07-29 | End: 2022-07-31

## 2022-07-29 RX ORDER — CHLORHEXIDINE GLUCONATE 213 G/1000ML
1 SOLUTION TOPICAL ONCE
Refills: 0 | Status: DISCONTINUED | OUTPATIENT
Start: 2022-07-29 | End: 2022-07-29

## 2022-07-29 RX ORDER — MAGNESIUM HYDROXIDE 400 MG/1
30 TABLET, CHEWABLE ORAL
Refills: 0 | Status: DISCONTINUED | OUTPATIENT
Start: 2022-07-29 | End: 2022-07-31

## 2022-07-29 RX ORDER — HYDROCORTISONE 1 %
1 OINTMENT (GRAM) TOPICAL EVERY 6 HOURS
Refills: 0 | Status: DISCONTINUED | OUTPATIENT
Start: 2022-07-29 | End: 2022-07-31

## 2022-07-29 RX ORDER — IBUPROFEN 200 MG
600 TABLET ORAL EVERY 6 HOURS
Refills: 0 | Status: DISCONTINUED | OUTPATIENT
Start: 2022-07-29 | End: 2022-07-29

## 2022-07-29 RX ORDER — KETOROLAC TROMETHAMINE 30 MG/ML
30 SYRINGE (ML) INJECTION ONCE
Refills: 0 | Status: DISCONTINUED | OUTPATIENT
Start: 2022-07-29 | End: 2022-07-29

## 2022-07-29 RX ADMIN — Medication 216 GRAM(S): at 09:19

## 2022-07-29 RX ADMIN — Medication 25 MILLIGRAM(S): at 20:47

## 2022-07-29 RX ADMIN — Medication 975 MILLIGRAM(S): at 19:00

## 2022-07-29 RX ADMIN — OXYCODONE HYDROCHLORIDE 5 MILLIGRAM(S): 5 TABLET ORAL at 17:50

## 2022-07-29 RX ADMIN — OXYCODONE HYDROCHLORIDE 5 MILLIGRAM(S): 5 TABLET ORAL at 19:00

## 2022-07-29 RX ADMIN — Medication 30 MILLIGRAM(S): at 16:32

## 2022-07-29 RX ADMIN — Medication 108 GRAM(S): at 13:20

## 2022-07-29 RX ADMIN — Medication 1000 MILLIUNIT(S)/MIN: at 14:57

## 2022-07-29 RX ADMIN — SODIUM CHLORIDE 3 MILLILITER(S): 9 INJECTION INTRAMUSCULAR; INTRAVENOUS; SUBCUTANEOUS at 21:00

## 2022-07-29 RX ADMIN — SODIUM CHLORIDE 1000 MILLILITER(S): 9 INJECTION, SOLUTION INTRAVENOUS at 18:00

## 2022-07-29 RX ADMIN — Medication 0.2 MILLIGRAM(S): at 18:00

## 2022-07-29 RX ADMIN — Medication 40 UNIT(S): at 18:13

## 2022-07-29 RX ADMIN — ERTAPENEM SODIUM 120 MILLIGRAM(S): 1 INJECTION, POWDER, LYOPHILIZED, FOR SOLUTION INTRAMUSCULAR; INTRAVENOUS at 20:29

## 2022-07-29 RX ADMIN — TRANEXAMIC ACID 220 MILLIGRAM(S): 100 INJECTION, SOLUTION INTRAVENOUS at 18:10

## 2022-07-29 RX ADMIN — Medication 30 MILLIGRAM(S): at 14:57

## 2022-07-29 RX ADMIN — Medication 975 MILLIGRAM(S): at 17:50

## 2022-07-29 NOTE — OB RN TRIAGE NOTE - FALL HARM RISK - UNIVERSAL INTERVENTIONS
Bed in lowest position, wheels locked, appropriate side rails in place/Call bell, personal items and telephone in reach/Instruct patient to call for assistance before getting out of bed or chair/Non-slip footwear when patient is out of bed/[place] to call system/Physically safe environment - no spills, clutter or unnecessary equipment/Purposeful Proactive Rounding/Room/bathroom lighting operational, light cord in reach

## 2022-07-29 NOTE — OB PROVIDER H&P - ASSESSMENT
30yo  female  @ 38.5 wks SLIUP uncomp PNC here in labor  -Pt was dw Dr Dalton; pt admitted for labor  -pt is GBS positive; pt ordered for Abx's  -pt was swabbed for covid-19  -PNR's were reviewed  -pt refusing epidural at this time

## 2022-07-29 NOTE — PROVIDER CONTACT NOTE (CHANGE IN STATUS NOTIFICATION) - ACTION/TREATMENT ORDERED:
Fast scan done at 1800. Labs and coags sent. @1802 1L bolus started and Methergine IM x 1 given. @ 1813 40 unit of Pit added to the bolus.  @1815 TXA and cytotec SD given. QBL noted 714cc. Pt placed on Methergine series. Pt educated about bleeding and of passing more clots to call for assistance. Pt stable resting in bed.  Resident to follow up on pt.

## 2022-07-29 NOTE — DISCHARGE NOTE OB - CARE PROVIDER_API CALL
Raffaele Hayes)  Obstetrics and Gynecology  [address] [place] [address]  Phone: [phone]  Fax: [phone]  Follow Up Time:

## 2022-07-29 NOTE — OB PROVIDER H&P - NSHPPHYSICALEXAM_GEN_ALL_CORE
ICU Vital Signs Last 24 Hrs  T(C): 36.8 (29 Jul 2022 08:34), Max: 36.8 (29 Jul 2022 08:13)  T(F): 98.24 (29 Jul 2022 08:34), Max: 98.24 (29 Jul 2022 08:34)  HR: 73 (29 Jul 2022 08:38) (73 - 79)  BP: 110/62 (29 Jul 2022 08:38) (100/59 - 110/62)  BP(mean): --  ABP: --  ABP(mean): --  RR: 16 (29 Jul 2022 08:13) (16 - 16)  SpO2: 100% (29 Jul 2022 08:13) (100% - 100%)    Gen: A&O x 3; NAD    Pulm-CTA B/l; no wheezes  Cor-clear S1S2  Abd exam-soft and nontender    NST cat II with 140 baseline with mod variability; +variables. Ctx's Q5-6 min  TAS to confirm presentation  VE-4/90/-2/posterior  GBS positive  EFW~3459

## 2022-07-29 NOTE — OB PROVIDER TRIAGE NOTE - NSOBPROVIDERNOTE_OBGYN_ALL_OB_FT
30yo  female  @ 38.5 wks SLIUP uncomp PNC here in labor  -Pt was dw Dr Dalton; pt admitted for labor  -pt is GBS positive; pt ordered for Abx's  -pt was swabbed for covid-19  -PNR's were reviewed

## 2022-07-29 NOTE — OB RN DELIVERY SUMMARY - NS_GBSABXNUMOFDOSES_OBGYN_ALL_OB_NU
Price (Use Numbers Only, No Special Characters Or $): 150
Consent: Written consent obtained and the risks of skin tag removal was reviewed with the patient including but not limited to bleeding, pigmentary change, infection, pain, and remote possibility of scarring.
Anesthesia Type: 1% lidocaine with epinephrine
Detail Level: Detailed
Removed With: gradle excision
2

## 2022-07-29 NOTE — OB RN PATIENT PROFILE - FALL HARM RISK - UNIVERSAL INTERVENTIONS
Bed in lowest position, wheels locked, appropriate side rails in place/Call bell, personal items and telephone in reach/Instruct patient to call for assistance before getting out of bed or chair/Non-slip footwear when patient is out of bed/[place] to call system/Physically safe environment - no spills, clutter or unnecessary equipment/Purposeful Proactive Rounding/Room/bathroom lighting operational, light cord in reach

## 2022-07-29 NOTE — CHART NOTE - NSCHARTNOTEFT_GEN_A_CORE
Ms. Wilson is a 31y  PPD#0 status post precipitous  (EBL 300ml) with postpartum course complicated by PPH. Pt was voiding when she felt light headed while straining and noticed a large gush of blood into the toilet. She called for her RN who helped her back to the bed. In the bed pt was evaluated and notably had two fully saturated pads on. Code OB called and Dr. Zhang immediately presented to patient bedside. TAUS was performed and notable for enlarged uterus distended with blood and clot. Bimanual performed and >500ml of blood and clot extracted. Second IV place, CBC, Coags, and CMP sent. IVF bolus administered with additional 40U of Oxytocin added in bag of LR,  0.2 IM Methergine x1 (and pt to be started on series), TXA x1, and 1,000mg Cytotec ND. Following all of these interventions uterus firm and no bleeding appreciated on fundal exam. Will f/u labs and reevaluated patient. VSS as below:      Vital Signs Last 24 Hrs  T(C): 36.9 (2022 17:29), Max: 36.9 (2022 17:29)  T(F): 98.5 (2022 17:29), Max: 98.5 (2022 17:29)  HR: 73 (2022 17:50) (67 - 92)  BP: 100/57 (2022 17:50) (95/51 - 128/68)  RR: 18 (2022 17:50) (16 - 19)  SpO2: 100% (2022 17:50) (92% - 100%)  Patient On (Oxygen Delivery Method): room air    Leonie Gold MD  OBGYN PGY4 Ms. Wilson is a 31y  PPD#0 status post precipitous  (EBL 300ml) with postpartum course complicated by PPH. Pt was voiding when she felt light headed while straining and noticed a large gush of blood into the toilet. She called for her RN who helped her back to the bed. In the bed pt was evaluated and notably had two fully saturated pads on. Code OB called and Dr. Zhang immediately presented to patient bedside. TAUS was performed and notable for enlarged uterus distended with blood and clot. Bimanual performed and >500ml of blood and clot extracted. Second IV place, CBC, Coags, and CMP sent. IVF bolus administered with additional 40U of Oxytocin added in bag of LR,  0.2 IM Methergine x1 (and pt to be started on series), TXA x1, and 1,000mg Cytotec IN. Following all of these interventions uterus firm and no bleeding appreciated on fundal exam. Will f/u labs and reevaluated patient. VSS as below:      Vital Signs Last 24 Hrs  T(C): 36.9 (2022 17:29), Max: 36.9 (2022 17:29)  T(F): 98.5 (2022 17:29), Max: 98.5 (2022 17:29)  HR: 73 (2022 17:50) (67 - 92)  BP: 100/57 (2022 17:50) (95/51 - 128/68)  RR: 18 (2022 17:50) (16 - 19)  SpO2: 100% (2022 17:50) (92% - 100%)  Patient On (Oxygen Delivery Method): room air    Leonie Gold MD  OBGYN PGY4    Ob attending    I was present and agree with  above--  will follow labs and consider transfusion if H/H low-  continue methergine series  post evacuation bedside sono improved still some clots but due to patient discomfort will not  reexplore unless bleeds again  invanz 1g IVPB    Angela OROZCO Ms. Wilson is a 31y  PPD#0 status post precipitous  (EBL 300ml) with postpartum course complicated by PPH. Pt was voiding when she felt light headed while straining and noticed a large gush of blood into the toilet. She called for her RN who helped her back to the bed. In the bed pt was evaluated and notably had two fully saturated pads on. Code OB called and Dr. Zhang immediately presented to patient bedside. TAUS was performed and notable for enlarged uterus distended with blood and clot. Bimanual performed and >500ml of blood and clot extracted. Second IV place, CBC, Coags, and CMP sent. IVF bolus administered with additional 40U of Oxytocin added in bag of LR,  0.2 IM Methergine x1 (and pt to be started on series), TXA x1, and 1,000mg Cytotec ND. Invanz x1 ordered for repetitive bimanual exams. Following all of these interventions uterus firm and no bleeding appreciated on fundal exam. Will f/u labs and reevaluated patient. VSS as below:      Vital Signs Last 24 Hrs  T(C): 36.9 (2022 17:29), Max: 36.9 (2022 17:29)  T(F): 98.5 (2022 17:29), Max: 98.5 (2022 17:29)  HR: 73 (2022 17:50) (67 - 92)  BP: 100/57 (2022 17:50) (95/51 - 128/68)  RR: 18 (2022 17:50) (16 - 19)  SpO2: 100% (2022 17:50) (92% - 100%)  Patient On (Oxygen Delivery Method): room air    Leonie Gold MD  OBGYN PGY4    Ob attending    I was present and agree with  above--  will follow labs and consider transfusion if H/H low-  continue methergine series  post evacuation bedside sono improved still some clots but due to patient discomfort will not  reexplore unless bleeds again  invanz 1g IVPB    Angela OROZCO

## 2022-07-29 NOTE — OB NEONATOLOGY/PEDIATRICIAN DELIVERY SUMMARY - NSPEDSNEONOTESA_OBGYN_ALL_OB_FT
Peds was called at 17 MOL requiring CPAP for this 38.5 weeks gestation baby girl delivered via  to a 30 year old mother,  ( in , , and ), O+, unremarkable prenatal labs, GBS positive (Ampicillin x2 doses), COVID negative. Mother was admitted with labor. AROM at 1350 on 22 with clear fluids. Highest maternal temp 36.8. EOS : 0.04. Mother has history of Left shoulder surgery in . As per L&D nurses baby emerged vigorous with spontaneous cry. Apgar 9/9. AT ~16MOL baby required CPAP for grunting and low sats and peds were called. Upon arrival weaned off CPAP, did chest PT and suctioning and baby gradually improved with sats > 95% and grunting intermittent and improving. Baby doing well in room air. Parents updated and baby will be transferred to NBN. Parents want to breast feed, wants Hep B vaccine and peds is [place].

## 2022-07-29 NOTE — OB PROVIDER DELIVERY SUMMARY - NSPROVIDERDELIVERYNOTE_OBGYN_ALL_OB_FT
Head delivered in BEA. Subsequently with gentle downward guidance, the anterior shoulder was delivered followed by the posterior shoulder and remainder of the body without difficulty.    Spontaneous cry. Infant passed to the mother. Cord clamping was delayed for 1 minute. Cord clamped and cut. Cord gasses obtained via a segment of cord. Infant to  nursery.    Placenta was delivered spontaneously intact. Uterine massage was performed and Oxytocin was given upon delivery of the placenta. Fundus noted to be firm.     No lacerations seen.    Adequate hemostasis. EBL: 300  Count correct x2.

## 2022-07-29 NOTE — OB PROVIDER TRIAGE NOTE - NSHPPHYSICALEXAM_GEN_ALL_CORE
ICU Vital Signs Last 24 Hrs  T(C): 36.8 (29 Jul 2022 08:34), Max: 36.8 (29 Jul 2022 08:13)  T(F): 98.24 (29 Jul 2022 08:34), Max: 98.24 (29 Jul 2022 08:34)  HR: 73 (29 Jul 2022 08:38) (73 - 79)  BP: 110/62 (29 Jul 2022 08:38) (100/59 - 110/62)  BP(mean): --  ABP: --  ABP(mean): --  RR: 16 (29 Jul 2022 08:13) (16 - 16)  SpO2: 100% (29 Jul 2022 08:13) (100% - 100%)    Gen: A&O x 3; NAD    Pulm-CTA B/l; no wheezes  Cor-clear S1S2  Abd exam-soft and nontender    NST cat II with 140 baseline with mod variability; +variables. Ctx's Q5-6 min  TAS to confirm presentation  VE-4/90/-2/posterior  GBS positive  EFW~3459 none

## 2022-07-29 NOTE — OB PROVIDER H&P - HISTORY OF PRESENT ILLNESS
32yo  female  @ 38.5 wks SLIUP uncomp PNC here complaining of ctx's Q5-6 min apart. Pt is intact with GFM. Pt reports she was examined on  and was 1 cm dilated.     Pmhx- denies  Pshx/Hosp- L shoulder arthroscopy  Meds- PNV  NKDA  Past ob-3/1/2013-6#  FT uncomp               2014- 9#  FT uncomp               2021-5#  FT uncomp  Gyn-denies  Soc-denies

## 2022-07-29 NOTE — OB PROVIDER TRIAGE NOTE - HISTORY OF PRESENT ILLNESS
30yo  female  @ 38.5 wks SLIUP uncomp PNC here complaining of ctx's Q5-6 min apart. Pt is intact with GFM. Pt reports she was examined on  and was 1 cm dilated.     Pmhx- denies  Pshx/Hosp- L shoulder arthroscopy  Meds- PNV  NKDA  Past ob-3/1/2013-6#  FT uncomp               2014- 9#  FT uncomp               2021-5#  FT uncomp  Gyn-denies  Soc-denies

## 2022-07-29 NOTE — OB PROVIDER DELIVERY SUMMARY - NSVAGDELIVERYA_OBGYN_ALL_OB
Physical Therapy Daily Treatment      Visit Count: 4 of 13 (visits) to 04-26-17 (date)  Authorization Status: See requirements below  Next Referring Provider Visit: 2/23/17    Initial Evaluation Date: 1/27/2017  Referred by: Dr. Isac Leroy MD  Medical Diagnosis (from order): S76.111A Quadriceps tendon rupture, right, initial encounter  Z98.890 Status post arthroscopy of right knee   Referral Information: S/p Right knee open quadriceps tendon tear repair and arthroscopy with chondroplasty of the patella  Treatments and Modalities per therapist plan of care  WBAT with knee brace locked in full extension  Active/Passive knee ROM as tolerated  Quad isometrics/straight leg raises  E-stim Quad  No restricted knee extension for 4 weeks  Primary Insurance: [employer]  Secondary Insurance: N/A  Requirements:   AUTHORIZED FOR 1 EVAL + 12 VISITS, FOR A TOTAL  AUTH # [id number]    Date of Surgery: 1-26-17; surgery performed: Right knee open quadriceps tendon tear repair and arthroscopy with chondroplasty of the patella; rehabilitation guidelines: Yes, see protocol in patient's blue folder  3 weeks post op 2-16-17    Per 's note on 2-2-17: \"He is weightbearing as tolerated to the surgical limb with a knee brace locked in full extension until seen at his next office visit. He will continue with both active and passive knee range of motion exercises. I would anticipate discontinuing brace immobilization and progressing with strengthening exercises after his next office visit.\"  Diagnosis Precautions: See protocol in patient's blue folder  Relevant co-morbidities and medications: Sheridan gandara, scheduled for surgery with Dr. Blakely on 2/9/17  Relevant Tests: Relevant diagnostic tests: plain film radiograph, MRI       SUBJECTIVE   Jayme had surgery on his left hand last week. Denies pain into the knee, haven't taken any pain meds even with the hand surgery, but feels he may need tonight  due to the hand throbbing with dressing change and new exercises from today.  Still notes that he cannot lift the right leg on its own.  Current Pain: 2/10.    Functional Change: Patient arrived to therapy without crutches    OBJECTIVE   Posture/Observation:  Arrived to therapy without an assistive device, wearing knee brace locked in extension.   Visibly increased swelling on right knee compared to left  Main incision covered with tubigrip, portal incisions covered with standard bandages, no visible signs of infection     Gait Analysis:  Ambulating independently without AD, full weight bearing on right lower extremity, right knee locked in extension in brace.     Palpation:  -Hypomobile patellar mobility, superior and inferior worse than medial/lateral  -boggy end feel with passive range of motion    Knee Circumference: (cm)   Left Right   10 cm Proximal      5 cm Proximal     Joint Line         42 cm 44 cm   5 cm Distal     10 cm Distal     Comments: Assessed in supine    Treatment   Therapeutic Exercise: 20 minutes  Exercise 1/27/17 2/2/17 2/8/17 2-16-17   Quad Set 10x 5 second holds 10x 5 second holds  15x 5 second holds in long sitting  15x 5 second holds in long sitting with towel roll under ankle  15x in standing with small red ball against wall (with brace on) Semi reclined, into pillow   Straight Leg Raise Unable secondary to pain  Unable with or without brace  Unable with or without brace Attempted, reps limited by knee pain; required AA to lift, passive lowering   Heel slide    15x using sliding board, completed under 60 degrees of motion  2x15 using sliding board, completed under 60 degrees of motion  -First set with therapist providing assistance  2nd set without assistance     Sidelying Hip Abduction      2x 15 repetitions with brace on 0# added, no brace    Standing Hip Extension      2x15 with brace on, bilateral upper extremity support in // bars PRONE, TKE 1st, then hip extension, no brace    hip  ABduction       -Resisted lateral gait, brace locked  -sidelie no brace AROM    large fitter       AP balance and weight shift                         Comments: Completed on right unless otherwise specified    \" = inch  CC = Cable column   # = pounds  CW/CCW = Clockwise/counterclockwise   // = Parallel bars  TA = Transverse abdominis   B = Bilateral   DL = Double limb   L = Left   SL = Single limb   R = Right   TB = Theraband   LE = Lower Extremity  UE = Upper Extremity        Modalities:  Patient has been made aware of potential contraindications and possible risks associated with the use of the following modalities and has agreed.  Ultrasound (46224):  Location: right quad and suprapatellar area; Position: semi reclined; Duration: 8 minutes.  Intensity: 1.4 W/cm2, Duty Cycle: 100% duty cycle; Frequency: 1 Mhz.   Modality treatment resulted in anticipated improved soft tissue pliability to decrease pain with quad setting and SLR.  Patient reports no adverse reaction to treatment.     Manual Therapy: 15 minutes  -Manual passive stretching into 30-60 degrees of knee flexion with patient in semi-reclined position  -Patellar mobilizations in all directions, especially focused on superior/inferior.  -soft tissue massage to the right quad/suprapatellar area    Initial Home Program:  * above denotes home program issuance as well  Home Exercise Program:  Exercise: Date issued Date DC Comments   Quad Set, patellar mobilizations 1-27-17        Heel slide within 60 degrees of motion  2-2-17       Sidelying hip abduction, Standing hip extension, Standing quad set  2-8-17    All to be performed with brace on    prone TKE + hip extension 2-16-17                     Patient Education: 0 minutes      ASSESSMENT   Ultrasound applied to the distal 1/3 of the right quad today due to reported pain with quad set and SLR.  With decreasing soft tissue restrictions in the quad, less strain to be placed through the quad and patella.  He  did note less pain into quad setting following the ultrasound.  SLR active lift less painful, however eccentric lowering causing greater pain.  Writer passively lowered the right leg to result in less pain/strain into the right knee during SLR.  Posterior hip/lateral hlip quite weak.  He may be possibly be working into ambulation in an open brace next week, but he needs to demonstrate improved quad control and less pain.    Patient will continue to be seen once next week while he is still in this maximum protective phase of recovery. Will be appropriate to see patient twice per week in the future to advance range of motion and strengthening as post-surgical protocol allows.     Pain after treatment: 1-2/10  Result of above outlined education: Verbalizes understanding, Demonstrates understanding and Needs reinforcement      Goals:  To be obtained by end of this plan of care:  1. Patient independent with modified and progressed home exercise program.  2. Patient will decrease involved knee pain to 0/10 to aid in community ambulation for activities of independent living, reciprocal stair ambulation and age appropriate activities.   3. Patient will increase involved knee active range of motion to 0-130° to aid in reciprocal stair ambulation, age appropriate activities and sitting/standing to cook/eat a meal.   4. Patient will increase involved knee strength to 5/5 to aid in community ambulation for activities of independent living, reciprocal stair ambulation and squatting for lifting for household independent living.  5. Patient will be able to ambulate modified independent/independent with no assistive device for 45 minutes without  pain/difficulty to improve function in grocery shopping, ambulate to physician appointments and community interaction.  6. Patient will ambulate community distances on even and uneven terrain with normal gait pattern without assistive device and without loss of balance.  7. Patient will  demonstrate ability to negotiate level and unlevel surfaces at variable velocities, including change of direction without increased pain or instability to return to age appropriate and community activities at prior level of function.  8. Patient will be able to ascend and descend 1 flight of stairs reciprocal without  pain/difficulty..  9. Patient will be able to tolerate standing activities for greater than or equal to 90 minutes without  pain/difficulty  10. Improve single leg standing balance to 30 seconds to improve ability to ambulate on level and unlevel surfaces to improve function in community interaction and reduce risk for falls.  11. Patient will be able to sleep 6 hours without disruption from pain.   12. Lower Extremity Functional Scale: Patient will complete form to reflect an improved score from initial score of 2 to greater than or equal to 60 (0=extreme difficulty; 80=no difficulty) to indicate pt reported improvement in function/disability/impairment (minimal detectable change: 9 points).     PLAN   Passive and active assistive range of motion between 0-60 degrees knee flexion, patellar mobilizations, quad setting, AAROM SLR  Consider hip isometrics in all planes as able  Ice and modalities to reduce pain and inflammation- check response with the ultrasound, apply again if beneficial    Skilled training and instruction for the following interventions:  Gait training 56286  Manual therapy 22678  Neuromuscular re-education 58640  Tape application  Therapeutic activity 91632  Therapeutic exercise 97984  Iontophoresis (63603) dexamethasone sodium phosphate, 4mg/ml  Ultrasound/Phonophoresis (52408)    THERAPY DAILY BILLING   Primary Insurance: [employer]  Secondary Insurance: N/A    Evaluation Procedures:  No evaluation codes were used on this date of service    Timed Procedures:  Manual Therapy, 15 minutes  Therapeutic Exercise, 20 minutes  Ultrasound, 10 minutes     Untimed  Procedures:  No untimed codes were used on this date of service    Total Treatment Time: 40 minutes    G-Code: G-Code Score ABN form  insurance type does not require G-codes  Modifier based on outcome measure(s)/functional testing/clinical judgement as listed above   Spontaneous

## 2022-07-29 NOTE — DISCHARGE NOTE OB - CARE PLAN
Principal Discharge DX:	Normal vaginal delivery  Assessment and plan of treatment:	follow up 6 weeks   1

## 2022-07-29 NOTE — OB RN DELIVERY SUMMARY - NSSELHIDDEN_OBGYN_ALL_OB_FT
[NS_DeliveryAttending1_OBGYN_ALL_OB_FT:[id number]],[NS_DeliveryAssist1_OBGYN_ALL_OB_FT:[id number]],[NS_DeliveryRN_OBGYN_ALL_OB_FT:[id number]=]

## 2022-07-29 NOTE — DISCHARGE NOTE OB - NS MD DC FALL RISK RISK
For information on Fall & Injury Prevention, visit: [email or web address] OR  [email or web address] OR  https://www.cdc.gov/steadi/patient.html

## 2022-07-29 NOTE — OB RN DELIVERY SUMMARY - NS_SEPSISRSKCALC_OBGYN_ALL_OB_FT
EOS calculated successfully. EOS Risk Factor: 0.5/1000 live births ([facility] national incidence); GA=38w5d; Temp=98.24; ROM=0.483; GBS='Positive'; Antibiotics='GBS specific antibiotics > 2 hrs prior to birth'

## 2022-07-29 NOTE — PROVIDER CONTACT NOTE (CHANGE IN STATUS NOTIFICATION) - BACKGROUND
s/p precipitous delivery today  at 1419. Pt arrived to floor around 530pm. VS and orhostatic WNL. Pt wanted to void and was assisted by RN and PCA to bathroom. Pt c/o of cramping and pain. Pt passed clot and noted bleeding with voiding. While pt stood, pt felt lightheaded and dizziness. Pt states she passed out briefly and  and PCA assisted pt down to sit on floor to prevent pt from falling. PCA called for help. Once pt felt stable, pt was moved to the bed. ANM and OB Resident were called. While resident was assessing pt, more bleeding was noted. Code OB called at 1750.

## 2022-07-29 NOTE — PROVIDER CONTACT NOTE (CHANGE IN STATUS NOTIFICATION) - SITUATION
Pt felt dizzy and lightheaded and verbalized she passed out briefly while assisted to void in batheroom. PCA and  with pt in bathroom and was assisted to sit on floor of bathroom to prevent falls.

## 2022-07-29 NOTE — OB RN TRIAGE NOTE - NSICDXPASTSURGICALHX_GEN_ALL_CORE_FT
PAST SURGICAL HISTORY:   (normal spontaneous vaginal delivery)  FT 6#7  2013 FT 9#    Status post total shoulder arthroplasty, left 2015

## 2022-07-29 NOTE — OB PROVIDER DELIVERY SUMMARY - NSSELHIDDEN_OBGYN_ALL_OB_FT
[NS_DeliveryAttending1_OBGYN_ALL_OB_FT:[id number]==],[NS_DeliveryAssist1_OBGYN_ALL_OB_FT:[id number]]

## 2022-07-30 LAB
BASOPHILS # BLD AUTO: 0.03 K/UL — SIGNIFICANT CHANGE UP (ref 0–0.2)
BASOPHILS NFR BLD AUTO: 0.3 % — SIGNIFICANT CHANGE UP (ref 0–2)
EOSINOPHIL # BLD AUTO: 0.08 K/UL — SIGNIFICANT CHANGE UP (ref 0–0.5)
EOSINOPHIL NFR BLD AUTO: 0.8 % — SIGNIFICANT CHANGE UP (ref 0–6)
HCT VFR BLD CALC: 26.3 % — LOW (ref 34.5–45)
HCT VFR BLD CALC: 27.8 % — LOW (ref 34.5–45)
HGB BLD-MCNC: 8.4 G/DL — LOW (ref 11.5–15.5)
HGB BLD-MCNC: 8.9 G/DL — LOW (ref 11.5–15.5)
IANC: 7.38 K/UL — SIGNIFICANT CHANGE UP (ref 1.8–7.4)
IMM GRANULOCYTES NFR BLD AUTO: 0.5 % — SIGNIFICANT CHANGE UP (ref 0–1.5)
LYMPHOCYTES # BLD AUTO: 1.81 K/UL — SIGNIFICANT CHANGE UP (ref 1–3.3)
LYMPHOCYTES # BLD AUTO: 17.6 % — SIGNIFICANT CHANGE UP (ref 13–44)
MCHC RBC-ENTMCNC: 26.7 PG — LOW (ref 27–34)
MCHC RBC-ENTMCNC: 26.8 PG — LOW (ref 27–34)
MCHC RBC-ENTMCNC: 31.9 GM/DL — LOW (ref 32–36)
MCHC RBC-ENTMCNC: 32 GM/DL — SIGNIFICANT CHANGE UP (ref 32–36)
MCV RBC AUTO: 83.5 FL — SIGNIFICANT CHANGE UP (ref 80–100)
MCV RBC AUTO: 84 FL — SIGNIFICANT CHANGE UP (ref 80–100)
MONOCYTES # BLD AUTO: 0.93 K/UL — HIGH (ref 0–0.9)
MONOCYTES NFR BLD AUTO: 9 % — SIGNIFICANT CHANGE UP (ref 2–14)
NEUTROPHILS # BLD AUTO: 7.38 K/UL — SIGNIFICANT CHANGE UP (ref 1.8–7.4)
NEUTROPHILS NFR BLD AUTO: 71.8 % — SIGNIFICANT CHANGE UP (ref 43–77)
NRBC # BLD: 0 /100 WBCS — SIGNIFICANT CHANGE UP
NRBC # BLD: 0 /100 WBCS — SIGNIFICANT CHANGE UP
NRBC # FLD: 0 K/UL — SIGNIFICANT CHANGE UP
NRBC # FLD: 0 K/UL — SIGNIFICANT CHANGE UP
PLATELET # BLD AUTO: 174 K/UL — SIGNIFICANT CHANGE UP (ref 150–400)
PLATELET # BLD AUTO: 193 K/UL — SIGNIFICANT CHANGE UP (ref 150–400)
RBC # BLD: 3.13 M/UL — LOW (ref 3.8–5.2)
RBC # BLD: 3.33 M/UL — LOW (ref 3.8–5.2)
RBC # FLD: 13.5 % — SIGNIFICANT CHANGE UP (ref 10.3–14.5)
RBC # FLD: 13.9 % — SIGNIFICANT CHANGE UP (ref 10.3–14.5)
WBC # BLD: 10.28 K/UL — SIGNIFICANT CHANGE UP (ref 3.8–10.5)
WBC # BLD: 9.06 K/UL — SIGNIFICANT CHANGE UP (ref 3.8–10.5)
WBC # FLD AUTO: 10.28 K/UL — SIGNIFICANT CHANGE UP (ref 3.8–10.5)
WBC # FLD AUTO: 9.06 K/UL — SIGNIFICANT CHANGE UP (ref 3.8–10.5)

## 2022-07-30 RX ORDER — FERROUS SULFATE 325(65) MG
325 TABLET ORAL DAILY
Refills: 0 | Status: DISCONTINUED | OUTPATIENT
Start: 2022-07-30 | End: 2022-07-31

## 2022-07-30 RX ORDER — ASCORBIC ACID 60 MG
500 TABLET,CHEWABLE ORAL DAILY
Refills: 0 | Status: DISCONTINUED | OUTPATIENT
Start: 2022-07-30 | End: 2022-07-31

## 2022-07-30 RX ADMIN — Medication 0.2 MILLIGRAM(S): at 18:06

## 2022-07-30 RX ADMIN — Medication 975 MILLIGRAM(S): at 04:08

## 2022-07-30 RX ADMIN — Medication 975 MILLIGRAM(S): at 12:21

## 2022-07-30 RX ADMIN — Medication 600 MILLIGRAM(S): at 23:48

## 2022-07-30 RX ADMIN — SIMETHICONE 80 MILLIGRAM(S): 80 TABLET, CHEWABLE ORAL at 23:48

## 2022-07-30 RX ADMIN — Medication 975 MILLIGRAM(S): at 18:50

## 2022-07-30 RX ADMIN — Medication 325 MILLIGRAM(S): at 12:21

## 2022-07-30 RX ADMIN — Medication 0.2 MILLIGRAM(S): at 12:21

## 2022-07-30 RX ADMIN — Medication 0.2 MILLIGRAM(S): at 06:51

## 2022-07-30 RX ADMIN — Medication 500 MILLIGRAM(S): at 12:22

## 2022-07-30 RX ADMIN — Medication 975 MILLIGRAM(S): at 13:00

## 2022-07-30 RX ADMIN — Medication 975 MILLIGRAM(S): at 18:05

## 2022-07-30 RX ADMIN — SODIUM CHLORIDE 3 MILLILITER(S): 9 INJECTION INTRAMUSCULAR; INTRAVENOUS; SUBCUTANEOUS at 05:00

## 2022-07-30 RX ADMIN — SODIUM CHLORIDE 3 MILLILITER(S): 9 INJECTION INTRAMUSCULAR; INTRAVENOUS; SUBCUTANEOUS at 14:00

## 2022-07-30 RX ADMIN — Medication 0.2 MILLIGRAM(S): at 00:19

## 2022-07-30 RX ADMIN — SODIUM CHLORIDE 3 MILLILITER(S): 9 INJECTION INTRAMUSCULAR; INTRAVENOUS; SUBCUTANEOUS at 23:31

## 2022-07-30 RX ADMIN — Medication 600 MILLIGRAM(S): at 01:30

## 2022-07-30 RX ADMIN — Medication 975 MILLIGRAM(S): at 04:45

## 2022-07-30 RX ADMIN — Medication 600 MILLIGRAM(S): at 00:44

## 2022-07-30 RX ADMIN — Medication 1 TABLET(S): at 12:21

## 2022-07-30 NOTE — PROVIDER CONTACT NOTE (OTHER) - BACKGROUND
2022-parity 4004-PPH on postpartum unit total EBL 700plus-s/p im Methergine ,TXA ,Cytotec Methergine series, s/p faint

## 2022-07-30 NOTE — PROVIDER CONTACT NOTE (OTHER) - ACTION/TREATMENT ORDERED:
0420-Dr Penn notified of  2 more  walnut-egg size clots passed when patient voiding ,urine still bloody from vaginal bleeding-will come to scan patient, VSS ,no c/o

## 2022-07-30 NOTE — PROVIDER CONTACT NOTE (OTHER) - ASSESSMENT
pt alert ,fundus firm ,lochia moderate while pt in bed but urine bloody from mixing with lochia- vital signs stable, ,tolerating po diet, transfusion  of RBC in progress ,no c/o

## 2022-07-31 VITALS
SYSTOLIC BLOOD PRESSURE: 103 MMHG | HEART RATE: 82 BPM | TEMPERATURE: 98 F | OXYGEN SATURATION: 100 % | RESPIRATION RATE: 18 BRPM | DIASTOLIC BLOOD PRESSURE: 60 MMHG

## 2022-07-31 LAB
HCT VFR BLD CALC: 25.1 % — LOW (ref 34.5–45)
HGB BLD-MCNC: 8.3 G/DL — LOW (ref 11.5–15.5)
MCHC RBC-ENTMCNC: 27.1 PG — SIGNIFICANT CHANGE UP (ref 27–34)
MCHC RBC-ENTMCNC: 33.1 GM/DL — SIGNIFICANT CHANGE UP (ref 32–36)
MCV RBC AUTO: 82 FL — SIGNIFICANT CHANGE UP (ref 80–100)
NRBC # BLD: 0 /100 WBCS — SIGNIFICANT CHANGE UP
NRBC # FLD: 0 K/UL — SIGNIFICANT CHANGE UP
PLATELET # BLD AUTO: 178 K/UL — SIGNIFICANT CHANGE UP (ref 150–400)
RBC # BLD: 3.06 M/UL — LOW (ref 3.8–5.2)
RBC # FLD: 14.2 % — SIGNIFICANT CHANGE UP (ref 10.3–14.5)
WBC # BLD: 6.52 K/UL — SIGNIFICANT CHANGE UP (ref 3.8–10.5)
WBC # FLD AUTO: 6.52 K/UL — SIGNIFICANT CHANGE UP (ref 3.8–10.5)

## 2022-07-31 RX ADMIN — Medication 600 MILLIGRAM(S): at 05:40

## 2022-07-31 RX ADMIN — SODIUM CHLORIDE 3 MILLILITER(S): 9 INJECTION INTRAMUSCULAR; INTRAVENOUS; SUBCUTANEOUS at 05:52

## 2022-07-31 RX ADMIN — Medication 600 MILLIGRAM(S): at 05:10

## 2022-07-31 RX ADMIN — Medication 600 MILLIGRAM(S): at 00:18

## 2022-07-31 NOTE — PROGRESS NOTE ADULT - SUBJECTIVE AND OBJECTIVE BOX
R1 Progress Note    Patient seen and examined at bedside. No acute complaints, pain well controlled. Patient is feeling much better after her blood transfusion. Overnight, she passed 3 "walnut" sized clots while she was urinating, but had minimal other bleeding. Patient is ambulating, voiding spontaneously, and tolerating regular diet. Denies CP, SOB, N/V, HA, blurred vision, epigastric pain, dizziness.    Vital Signs Last 24 Hours  T(C): 36.7 (07-30-22 @ 05:54), Max: 37.7 (07-29-22 @ 20:00)  HR: 72 (07-30-22 @ 05:54) (67 - 100)  BP: 98/50 (07-30-22 @ 05:54) (93/46 - 128/68)  RR: 18 (07-30-22 @ 05:54) (16 - 19)  SpO2: 99% (07-30-22 @ 05:54) (92% - 100%)    Physical Exam:  General: NAD  Abdomen: Soft, non-tender, non-distended, fundus firm  Pelvic: Lochia wnl    Labs:    Blood Type: O Positive  Antibody Screen: Negative  RPR: Negative               8.4    10.28 )-----------( 174      ( 07-30 @ 05:05 )             26.3                6.6    9.80  )-----------( 156      ( 07-29 @ 18:19 )             21.0                9.9    7.70  )-----------( 213      ( 07-29 @ 09:10 )             31.2         MEDICATIONS  (STANDING):  acetaminophen     Tablet .. 975 milliGRAM(s) Oral <User Schedule>  diphtheria/tetanus/pertussis (acellular) Vaccine (ADAcel) 0.5 milliLiter(s) IntraMuscular once  ibuprofen  Tablet. 600 milliGRAM(s) Oral every 6 hours  methylergonovine 0.2 milliGRAM(s) Oral every 6 hours  prenatal multivitamin 1 Tablet(s) Oral daily  sodium chloride 0.9% lock flush 3 milliLiter(s) IV Push every 8 hours    MEDICATIONS  (PRN):  benzocaine 20%/menthol 0.5% Spray 1 Spray(s) Topical every 6 hours PRN for Perineal discomfort  dibucaine 1% Ointment 1 Application(s) Topical every 6 hours PRN Perineal discomfort  diphenhydrAMINE 25 milliGRAM(s) Oral every 6 hours PRN Pruritus  hydrocortisone 1% Cream 1 Application(s) Topical every 6 hours PRN Moderate Pain (4-6)  lanolin Ointment 1 Application(s) Topical every 6 hours PRN nipple soreness  magnesium hydroxide Suspension 30 milliLiter(s) Oral two times a day PRN Constipation  oxyCODONE    IR 5 milliGRAM(s) Oral every 3 hours PRN Severe Pain (7 - 10)  pramoxine 1%/zinc 5% Cream 1 Application(s) Topical every 4 hours PRN Moderate Pain (4-6)  simethicone 80 milliGRAM(s) Chew every 4 hours PRN Gas  witch hazel Pads 1 Application(s) Topical every 4 hours PRN Perineal discomfort  
S: Patient doing well. Minimal lochia. Pain controlled.    O: Vital Signs Last 24 Hrs  T(C): 36.7 (2022 05:59), Max: 37 (2022 14:11)  T(F): 98.1 (2022 05:59), Max: 98.6 (2022 14:11)  HR: 73 (2022 05:48) (69 - 80)  BP: 111/72 (2022 05:48) (98/55 - 116/60)  BP(mean): --  RR: 18 (2022 05:48) (17 - 18)  SpO2: 100% (2022 05:48) (97% - 100%)    Parameters below as of 2022 05:48  Patient On (Oxygen Delivery Method): room air        Gen: NAD  Abd: soft, NT, ND, fundus firm below umbilicus  Lochia: moderate  Ext: no tenderness    Labs:                        8.9    9.06  )-----------( 193      ( 2022 20:35 )             27.8       A: 31y PPD#2 s/p  doing well.   Plan: DC with instructions
S: Patient doing well. Minimal lochia. Pain controlled. Denies dizziness lightheadedness    O: Vital Signs Last 24 Hrs  T(C): 36.4 (2022 04:00), Max: 37.7 (2022 20:00)  T(F): 97.6 (2022 04:00), Max: 99.8 (2022 20:00)  HR: 80 (2022 04:00) (67 - 100)  BP: 104/64 (2022 04:00) (93/46 - 128/68)  BP(mean): --  RR: 16 (2022 04:00) (16 - 19)  SpO2: 100% (2022 04:00) (92% - 100%)    Parameters below as of 2022 17:49  Patient On (Oxygen Delivery Method): room air        Gen: NAD  Abd: soft, NT, ND, fundus firm below umbilicus  Lochia: moderate  Ext: no tenderness    Labs:                        6.6    9.80  )-----------( 156      ( 2022 18:19 )             21.0   rh pos    A: 31y PPD#1 s/p  PPH doing well.    Plan:  CBC pending sp 1U PRBC  patient feeling much better   ambulation urged    Lai OROZCO

## 2023-01-13 NOTE — DISCHARGE NOTE OB - CRACKED, BLEEDING NIPPLES
History  Chief Complaint   Patient presents with   • COVID-19 Exposure     [address] w/complaint of "I took a Covid test 3 days ago & it was positive"; in ED for evaluation because "when I breathe I can feel my lungs"; taking tylenol for generalized body aches w/last dose @ 1000 today; last fever x2 days ago;      20-year-old male with history of cigarette smoking presents with chest pain and intermittent shortness of breath after positive COVID test 3 days ago  Patient states that he had approximately 4 days of fever chills fatigue which is overall improving  He has not had a fever in 2 days and his fatigue and myalgias have improved today but he also had onset of intermittent pain with respirations  He has been checking his pulse ox at home and it is always greater than 95%  Tolerating p o  without difficulty  None       History reviewed  No pertinent past medical history  History reviewed  No pertinent surgical history  History reviewed  No pertinent family history  I have reviewed and agree with the history as documented  E-Cigarette/Vaping   • E-Cigarette Use Never User      E-Cigarette/Vaping Substances   • Nicotine No    • THC No    • CBD No    • Flavoring No    • Other No    • Unknown No      Social History     Tobacco Use   • Smoking status: Every Day     Packs/day: 0 50     Types: Cigarettes   • Smokeless tobacco: Never   Vaping Use   • Vaping Use: Never used   Substance Use Topics   • Alcohol use: Not Currently   • Drug use: Never       Review of Systems   Constitutional: Positive for chills, fatigue and fever  HENT: Negative for rhinorrhea and sore throat  Eyes: Negative for discharge and visual disturbance  Respiratory: Positive for shortness of breath  Negative for cough  Cardiovascular: Positive for chest pain  Negative for palpitations  Gastrointestinal: Negative for abdominal pain, diarrhea, nausea and vomiting  Genitourinary: Negative for dysuria and hematuria  Musculoskeletal: Negative for joint swelling and myalgias  Skin: Negative for rash  Neurological: Negative for weakness  Hematological: Does not bruise/bleed easily  Psychiatric/Behavioral: Negative for suicidal ideas  Physical Exam  Physical Exam  Constitutional:       General: He is not in acute distress  HENT:      Head: Normocephalic and atraumatic  Mouth/Throat:      Mouth: Mucous membranes are moist       Pharynx: Oropharynx is clear  Eyes:      Extraocular Movements: Extraocular movements intact  Conjunctiva/sclera: Conjunctivae normal       Pupils: Pupils are equal, round, and reactive to light  Cardiovascular:      Rate and Rhythm: Normal rate and regular rhythm  Heart sounds: Normal heart sounds  Pulmonary:      Effort: Pulmonary effort is normal       Breath sounds: Normal breath sounds  No wheezing or rales  Abdominal:      General: There is no distension  Palpations: Abdomen is soft  Tenderness: There is no abdominal tenderness  Musculoskeletal:         General: Normal range of motion  Cervical back: Normal range of motion  Skin:     General: Skin is warm and dry  Coloration: Skin is not pale  Findings: No rash  Neurological:      General: No focal deficit present  Mental Status: He is alert and oriented to person, place, and time     Psychiatric:         Mood and Affect: Mood normal          Behavior: Behavior normal          Vital Signs  ED Triage Vitals [01/13/23 1701]   Temperature Pulse Respirations Blood Pressure SpO2   98 4 °F (36 9 °C) 73 16 122/79 99 %      Temp Source Heart Rate Source Patient Position - Orthostatic VS BP Location FiO2 (%)   Oral Monitor Sitting Left arm --      Pain Score       --           Vitals:    01/13/23 1701   BP: 122/79   Pulse: 73   Patient Position - Orthostatic VS: Sitting         Visual Acuity      ED Medications  Medications - No data to display    Diagnostic Studies  Results Reviewed None                 XR chest 2 views    (Results Pending)              Procedures  Procedures         ED Course  ED Course as of 01/13/23 1751   Fri Jan 13, 2023   1732 Healthy 60-year-old male with a history of cigarette use and recent positive COVID test with complaint of mild chest pain shortness of breath  EKG shows normal sinus rhythm 66 bpm with benign early repull, normal intervals, no ST or T wave abnormalities to indicate ischemia  1747 Chest x-ray shows no acute disease by my interpretation  Patient's oxygenation is within normal limits and he is breathing comfortably  Discharged with instructions for supportive care and return precautions for worsening shortness of breath or hypoxia  SBIRT 20yo+    Flowsheet Row Most Recent Value   SBIRT (23 yo +)    In order to provide better care to our patients, we are screening all of our patients for alcohol and drug use  Would it be okay to ask you these screening questions? Yes Filed at: 01/13/2023 1748   Initial Alcohol Screen: US AUDIT-C     1  How often do you have a drink containing alcohol? 0 Filed at: 01/13/2023 1748   2  How many drinks containing alcohol do you have on a typical day you are drinking? 0 Filed at: 01/13/2023 1748   3a  Male UNDER 65: How often do you have five or more drinks on one occasion? 0 Filed at: 01/13/2023 1748   3b  FEMALE Any Age, or MALE 65+: How often do you have 4 or more drinks on one occassion? 0 Filed at: 01/13/2023 1748   Audit-C Score 0 Filed at: 01/13/2023 1748   GENESIS: How many times in the past year have you    Used an illegal drug or used a prescription medication for non-medical reasons?  Never Filed at: 01/13/2023 1748                    MDM    Disposition  Final diagnoses:   [id number]     Time reflects when diagnosis was documented in both MDM as applicable and the Disposition within this note     Time User Action Codes Description Comment    1/13/2023  5:48 PM Arlene Campos Add [U07 1] COVID-19       ED Disposition     ED Disposition   Discharge    Condition   Stable    Date/Time   Fri Jan 13, 2023  5:48 PM    Comment   Anthony Pacheco discharge to home/self care  Follow-up Information    None         Patient's Medications    No medications on file       No discharge procedures on file      PDMP Review     None          ED Provider  Electronically Signed by           Elidia Bustillo MD  01/13/23 8240 Statement Selected

## 2023-02-11 NOTE — OB PROVIDER H&P - PSH
(normal spontaneous vaginal delivery)   FT 6#7   FT 9#  Status post total shoulder arthroplasty, left  2015  
Vaccine status unknown

## 2023-07-21 NOTE — OB RN DELIVERY SUMMARY - NS_MECONIUTRACHA_OBGYN_ALL_OB
MEDICAL ELIGIBILITY FORM    Name: Marva Walker YOB: 2008     Marva is Medically eligible for all sports without restriction    Recommendations: N/A    I have examined the student named on this form and completed the preparticipation physical evaluation. The athlete does not have apparent clinical contraindications to practice and can participate in the sport(s) as outlined on this form. A copy of the physical examination findings are on record in my office and can be made available to the school at the request of the parents. If conditions arise after the athlete has been cleared for participation, the physician may rescind the medical eligibility until the problem is resolved and the potential consequences are completely explained to the athlete (and parents or guardians).    Name of health care professional (print or type): Shruthi Pratt DO Date: 7/21/2023     Address: [facility] [place] [address]  Dept: [phone]     Signature of health care professional: _______________________________________      SHARED EMERGENCY INFORMATION    No Known Allergies    Current Outpatient Medications   Medication Instructions   • albuterol 108 (90 Base) MCG/ACT inhaler 2 puffs, Inhalation   • hydrOXYzine (ATARAX) 25 mg, Oral, NIGHTLY PRN   • pimecrolimus (ELIDEL) 1 % cream Apply twice daily to the affected areas on stomach for eczema   • sertraline (Zoloft) 100 MG tablet Take 1.5 pills (150 mg) by mouth daily.   • tretinoin (RETIN-A) 0.025 % cream Apply nightly as a pea-size dose of medication to each of the five areas of the face (central forehead, chin, nose, and cheeks) for acne       Other  information:_____________________________________________________________________  _____________________________________________________________________________________  _____________________________________________________________________________________    Emergency contacts:___________________________________________________________________  _____________________________________________________________________________________  _____________________________________________________________________________________     © 2019 [place] [employer], American Academy of Pediatrics, American College of Sport Medicine, American Medical Society for Sports Medicine, American Orthopaedics Society for Sport Medicine, and American Osteopathic Academy of Sports Medicine.  Permission is granted to reprint for noncommercial, educational purposes with acknowledgement.        PHYSICAL EXAMINATION FORM     Name: Marva Walker YOB: 2008   PHYSICIAN REMINDERS  1. Consider additional questions on more-sensitive issues.  Do you feel stressed out or under a lot of pressure?  Do you feel sad, hopeless, depressed or anxious?  Do you feel safe at your home or residence?  During the past 30 days, did you use chewing tobacco, snuff or dip?  Do you drink alcohol or user any other drugs?  Have you even taken anabolic steroids or used any other performance-enhancing supplement?  Have you even take any supplements to help you gain or lose weight or improve your performance?  Do you wear a seat belt, use a helmet, and use condoms?  2.  Consider reviewing questions on cardiovascular symptoms (Q4-Q13 of History Form).    EXAMINATION     Vitals: /68   Pulse 64   Temp 97.6 °F (36.4 °C) (Temporal)   Resp 16   Ht 5' 3.39\" (1.61 m)   Wt 62.7 kg (138 lb 2 oz)   LMP 06/30/2023 (Exact Date)   BMI 24.17 kg/m²   BSA 1.66 m²    Vision: R 20/               L 20/                      Corrected  Y         N      MEDICAL NORMAL ABNORMAL FINDINGS   Appearance  Marfan stigmata (kyphoscoliosis, high-arched palate, pectus excavatum, arachnodactyly, arm span > height, hyperlaxity, myopia, MVP, aortic insufficiency) Yes    Eyes, ears, nose, throat  Pupils equal  Hearing Yes    Lymph nodes Yes    Heart*  Murmurs (auscultation standing, auscultation supine, +/- Valsalva maneuver) Yes    Lungs Yes    Abdomen Yes    Skin  Herpes simplex virus (HSV), lesions suggestive of methicillin-resistant Staphylococcus aureus MRSA, or tinea corporis Yes    MUSCULOSKELETAL NORMAL ABNORMAL FINDINGS   Neck Yes    Back Yes    Shoulder and arm Yes    Elbow and forearm Yes    Wrist, hand, and fingers Yes    Hip and thigh Yes    Knee Yes    Leg and ankle Yes    Foot and toes Yes    Functional  Double-leg squat test, single-leg squat test, and box drop or step drop test Yes    * Consider electrocardiography ECG, echocardiogram, referral to cardiology for abnormal cardiac history or examination finding, or a combination of those.  Name of health care professional (print or type): Shruthi Pratt DO  Date: 7/21/2023  Address: [facility] [place] [address]  Dept: [phone]   Signature of health care professional: _______________________________________    © 2019 American Academy of Family Physicians, American Academy of Pediatrics, American College of Sport Medicine, American Medical Society for Sports Medicine, American Orthopaedics Society for Sport Medicine, and American Osteopathic Academy of Sports Medicine.  Permission is granted to reprint for noncommercial, educational purposes with acknowledgement.         HISTORY FORM  Note: Complete and sign this form (with your parents if younger than 18) before your appointment.  Name: Marva Walker YOB: 2008     Date of examination: 7/21/2023  Sport(s):_________________________________________   Sex assigned at birth: (F, M, or  other): female How do you identify your gender?(F, M, or other):_________     List past and current medical conditions:____________________________________________________________________  _______________________________________________________________________________________________________________    Have you ever had surgery? If yes, list all past surgical procedures: ______________________________________________  _______________________________________________________________________________________________________________    Medicines and supplements: List all current prescriptions, over-the-counter medicines, and supplements (herbal and nutritional):   ______________________________________________________________________________________________________________  ______________________________________________________________________________________________________________    Do you have any allergies? If yes, please list all your allergies (ie, medicines, pollens, food, stinging insects).   ______________________________________________________________________________________________________________  ______________________________________________________________________________________________________________       Patient Health Questionnaire Version 4 (PHQ-4)  Over the last 2 weeks, how often have you been bothered by any of the following problems? ([place] response.)   Not at all Several days Over half the days Nearly every day   Feeling nervous, anxious, or on edge 0 1 2 3   Not being able to stop or control worrying 0 1 2 3   Little interest or pleasure in doing things 0 1 2 3   Feeling down, depressed, or hopeless 0 1 2 3   (A sum of =3 is considered positive on either subscale [questions 1 and 2, or questions 3 and 4] for screening purposes.)     GENERAL QUESTIONS  (Explain “Yes” answers at the end of this form.  [place] questions if you don’t know the answer.)     Yes     No   1. Do you have any concerns  that you would like to discuss with your provider?       2. Has a provider ever denied or restricted your participation in sports for any reason?       3. Do you have any ongoing medical issues or recent illness?       HEART HEALTH QUESTIONS ABOUT YOU Yes No   4. Have you ever passed out or nearly passed out during or after exercise?       5. Have you ever had discomfort, pain, tightness, or pressure in your chest during exercise?       6. Does your heart ever race, flutter in your chest, or skip beats (irregular beats) during exercise?       7. Has a doctor ever told you that you have any heart problems?       8. Has a doctor ever requested a test for your heart? For example, electrocardiography (ECG) or echocardiography.      HEART HEALTH QUESTIONS ABOUT YOU (CONTINUED ) Yes No   9. Do you get light-headed or feel shorter of breath than your friends during exercise?       10. Have you ever had a seizure?       HEART HEALTH QUESTIONS ABOUT YOUR FAMILY Yes No   11. Has any family member or relative  of heart problems or had an unexpected or unexplained sudden death before age 35 years (including drowning or unexplained car crash)?       12. Does anyone in your family have a genetic heart problem such as hypertrophic cardiomyopathy (HCM), Marfan syndrome, arrhythmogenic right ventricular cardiomyopathy (ARVC), long QT syndrome (LQTS), short QT syndrome (SQTS), Brugada syndrome, or catecholaminergic polymorphic ventricular tachycardia (CPVT)?       13. Has anyone in your family had a pacemaker or an implanted defibrillator before age 35?                Name: Marva Walker YOB: 2008     BONE AND JOINT QUESTIONS Yes No   14. Have you ever had a stress fracture or an injury to a bone, muscle, ligament, joint, or tendon that caused you to miss a practice or game?       15. Do you have a bone, muscle, ligament, or joint injury that bothers you?       MEDICAL QUESTIONS Yes No   16. Do you cough, wheeze,  or have difficulty breathing during or after exercise?       17. Are you missing a kidney, an eye, a testicle (males), your spleen, or any other organ?       18. Do you have groin or testicle pain or a painful bulge or hernia in the groin area?       19. Do you have any recurring skin rashes or rashes that come and go, including herpes or methicillin-resistant Staphylococcus aureus  (MRSA)?       20. Have you had a concussion or head injury that caused confusion, a prolonged headache, or memory problems?       21. Have you ever had numbness, had tingling, had weakness in your arms or legs, or been unable to move your arms or legs after being hit or falling?       22. Have you ever become ill while exercising in the heat?       23. Do you or does someone in your family have sickle cell trait or disease?       24. Have you ever had or do you have any problems with your eyes or vision?        MEDICAL QUESTIONS (CONTINUED ) Yes No   25. Do you worry about your weight?         26. Are you trying to or has anyone recommended that you gain or lose weight?       27. Are you on a special diet or do you avoid certain types of foods or food groups?       28. Have you ever had an eating disorder?       FEMALES ONLY     29. Have you ever had a menstrual period?       30. How old were you when you had your first menstrual period?       31. When was your most recent menstrual period?       32. How many periods have you had in the past 12 months?         Explain \"Yes\" answers here.  ______________________________________________    ______________________________________________    ______________________________________________    ______________________________________________    ______________________________________________    ______________________________________________    ______________________________________________    ______________________________________________     I hereby state that, to the best of my knowledge, my  answers to the questions on this form are complete and correct.    Signature of athlete: ____________________________________________________________________________________    Signature of parent or guardian: ___________________________________________________________________________  Date: ________________________________________________  _____________________________________________________________________________________________________  © 2019 American Academy of Family Physicians, American Academy of Pediatrics, American College of Sports Medicine, American Medical Society for Sports Medicine, American Orthopaedic Society for Sports Medicine, and American Osteopathic Academy of Sports Medicine. Permission is granted to reprint for noncommercial, educational purposes with acknowledgment.   None

## 2023-08-16 NOTE — OB RN DELIVERY SUMMARY - BABY A: WEIGHT IN OUNCES (FROM GRAMS), DELIVERY
Patient has chronic hypothyroidism. TFTs reviewed-   Lab Results   Component Value Date    TSH 1.62 07/03/2023   . Will continue chronic levothyroxine and adjust for and clinical changes.       4

## 2023-09-19 ENCOUNTER — EMERGENCY (EMERGENCY)
Facility: HOSPITAL | Age: 33
LOS: 1 days | Discharge: HOME | End: 2023-09-19
Admitting: EMERGENCY MEDICINE
Payer: MEDICAID

## 2023-09-19 VITALS
OXYGEN SATURATION: 100 % | SYSTOLIC BLOOD PRESSURE: 155 MMHG | HEART RATE: 75 BPM | DIASTOLIC BLOOD PRESSURE: 77 MMHG | RESPIRATION RATE: 17 BRPM | TEMPERATURE: 98 F

## 2023-09-19 DIAGNOSIS — Z96.612 PRESENCE OF LEFT ARTIFICIAL SHOULDER JOINT: Chronic | ICD-10-CM

## 2023-09-19 LAB
ALBUMIN SERPL ELPH-MCNC: 4.4 G/DL — SIGNIFICANT CHANGE UP (ref 3.3–5)
ALP SERPL-CCNC: 91 U/L — SIGNIFICANT CHANGE UP (ref 40–120)
ALT FLD-CCNC: 14 U/L — SIGNIFICANT CHANGE UP (ref 4–33)
ANION GAP SERPL CALC-SCNC: 11 MMOL/L — SIGNIFICANT CHANGE UP (ref 7–14)
AST SERPL-CCNC: 13 U/L — SIGNIFICANT CHANGE UP (ref 4–32)
BASOPHILS # BLD AUTO: 0.02 K/UL — SIGNIFICANT CHANGE UP (ref 0–0.2)
BASOPHILS NFR BLD AUTO: 0.4 % — SIGNIFICANT CHANGE UP (ref 0–2)
BILIRUB SERPL-MCNC: 1.3 MG/DL — HIGH (ref 0.2–1.2)
BUN SERPL-MCNC: 11 MG/DL — SIGNIFICANT CHANGE UP (ref 7–23)
CALCIUM SERPL-MCNC: 8.6 MG/DL — SIGNIFICANT CHANGE UP (ref 8.4–10.5)
CHLORIDE SERPL-SCNC: 101 MMOL/L — SIGNIFICANT CHANGE UP (ref 98–107)
CO2 SERPL-SCNC: 26 MMOL/L — SIGNIFICANT CHANGE UP (ref 22–31)
CREAT SERPL-MCNC: 0.45 MG/DL — LOW (ref 0.5–1.3)
D DIMER BLD IA.RAPID-MCNC: <150 NG/ML DDU — SIGNIFICANT CHANGE UP
EGFR: 131 ML/MIN/1.73M2 — SIGNIFICANT CHANGE UP
EOSINOPHIL # BLD AUTO: 0.12 K/UL — SIGNIFICANT CHANGE UP (ref 0–0.5)
EOSINOPHIL NFR BLD AUTO: 2.2 % — SIGNIFICANT CHANGE UP (ref 0–6)
GLUCOSE SERPL-MCNC: 86 MG/DL — SIGNIFICANT CHANGE UP (ref 70–99)
HCT VFR BLD CALC: 35.7 % — SIGNIFICANT CHANGE UP (ref 34.5–45)
HGB BLD-MCNC: 12.6 G/DL — SIGNIFICANT CHANGE UP (ref 11.5–15.5)
IANC: 3.73 K/UL — SIGNIFICANT CHANGE UP (ref 1.8–7.4)
IMM GRANULOCYTES NFR BLD AUTO: 0 % — SIGNIFICANT CHANGE UP (ref 0–0.9)
LYMPHOCYTES # BLD AUTO: 1.43 K/UL — SIGNIFICANT CHANGE UP (ref 1–3.3)
LYMPHOCYTES # BLD AUTO: 25.8 % — SIGNIFICANT CHANGE UP (ref 13–44)
MCHC RBC-ENTMCNC: 30.6 PG — SIGNIFICANT CHANGE UP (ref 27–34)
MCHC RBC-ENTMCNC: 35.3 GM/DL — SIGNIFICANT CHANGE UP (ref 32–36)
MCV RBC AUTO: 86.7 FL — SIGNIFICANT CHANGE UP (ref 80–100)
MONOCYTES # BLD AUTO: 0.25 K/UL — SIGNIFICANT CHANGE UP (ref 0–0.9)
MONOCYTES NFR BLD AUTO: 4.5 % — SIGNIFICANT CHANGE UP (ref 2–14)
NEUTROPHILS # BLD AUTO: 3.73 K/UL — SIGNIFICANT CHANGE UP (ref 1.8–7.4)
NEUTROPHILS NFR BLD AUTO: 67.1 % — SIGNIFICANT CHANGE UP (ref 43–77)
NRBC # BLD: 0 /100 WBCS — SIGNIFICANT CHANGE UP (ref 0–0)
NRBC # FLD: 0 K/UL — SIGNIFICANT CHANGE UP (ref 0–0)
PLATELET # BLD AUTO: 191 K/UL — SIGNIFICANT CHANGE UP (ref 150–400)
POTASSIUM SERPL-MCNC: 3.9 MMOL/L — SIGNIFICANT CHANGE UP (ref 3.5–5.3)
POTASSIUM SERPL-SCNC: 3.9 MMOL/L — SIGNIFICANT CHANGE UP (ref 3.5–5.3)
PROT SERPL-MCNC: 6.9 G/DL — SIGNIFICANT CHANGE UP (ref 6–8.3)
RBC # BLD: 4.12 M/UL — SIGNIFICANT CHANGE UP (ref 3.8–5.2)
RBC # FLD: 11.9 % — SIGNIFICANT CHANGE UP (ref 10.3–14.5)
SODIUM SERPL-SCNC: 138 MMOL/L — SIGNIFICANT CHANGE UP (ref 135–145)
TROPONIN T, HIGH SENSITIVITY RESULT: <6 NG/L — SIGNIFICANT CHANGE UP
WBC # BLD: 5.55 K/UL — SIGNIFICANT CHANGE UP (ref 3.8–10.5)
WBC # FLD AUTO: 5.55 K/UL — SIGNIFICANT CHANGE UP (ref 3.8–10.5)

## 2023-09-19 PROCEDURE — 99285 EMERGENCY DEPT VISIT HI MDM: CPT

## 2023-09-19 PROCEDURE — 93010 ELECTROCARDIOGRAM REPORT: CPT

## 2023-09-19 PROCEDURE — 71046 X-RAY EXAM CHEST 2 VIEWS: CPT | Mod: 26

## 2023-09-19 RX ORDER — FAMOTIDINE 10 MG/ML
20 INJECTION INTRAVENOUS ONCE
Refills: 0 | Status: COMPLETED | OUTPATIENT
Start: 2023-09-19 | End: 2023-09-19

## 2023-09-19 RX ORDER — ASPIRIN/CALCIUM CARB/MAGNESIUM 324 MG
162 TABLET ORAL ONCE
Refills: 0 | Status: COMPLETED | OUTPATIENT
Start: 2023-09-19 | End: 2023-09-19

## 2023-09-19 RX ADMIN — Medication 162 MILLIGRAM(S): at 10:19

## 2023-09-19 RX ADMIN — FAMOTIDINE 20 MILLIGRAM(S): 10 INJECTION INTRAVENOUS at 10:18

## 2023-09-19 NOTE — ED PROVIDER NOTE - PROGRESS NOTE DETAILS
YEE Montez I attest and partook in the history and physical exam performed by PA student Angelia Pedraza.

## 2023-09-19 NOTE — ED PROVIDER NOTE - CLINICAL SUMMARY MEDICAL DECISION MAKING FREE TEXT BOX
DDX to consider but not limited to: GERD, PE, ACS, infectious etiology  plan: Will draw basic labs with Trop, D-dimer (if elevated check CTA chest), EKG, CXR , give Pepcid, ASA and reassess 32 y/o  Female no pmhx presenting with right sided chest pain since last right, sharp/stabbing radiates to the upper right back accompanied with SOB with inspiration, nausea, dizziness, HA and tingling in the arm. Describes it currently as a pressure/burning sensation in her chest that does come and go for the past 3 weeks, usually lasts a few minutes but lasting longer currently. Denies recent travel, fever, URI, OCP use, pregnancy (IUD), abdominal pain, n/v/d, calf pain/swelling. Denies family h/o blood clots. Had work up with PCP last week- no abnormal findings. Well appearing, NAD. VSS.  NKDA  No recent surgeries  No smoking   DDX to consider but not limited to: GERD, PE, ACS, infectious etiology  Plan: Will draw basic labs with Trop, D-dimer (if elevated check CTA chest), EKG, CXR , give Pepcid, ASA and reassess 34 y/o  Female no pmhx presenting with right sided chest pain since last right, sharp/stabbing radiates to the upper right back accompanied with SOB with inspiration, nausea, dizziness, HA and tingling in the arm. Describes it currently as a pressure/burning sensation in her chest that does come and go for the past 3 weeks, usually lasts a few minutes but lasting longer currently. Denies recent travel, fever, URI, OCP use, pregnancy (IUD), abdominal pain, n/v/d, calf pain/swelling. Denies family h/o blood clots. Had work up with PCP last week- no abnormal findings. Well appearing, NAD. VSS.  NKDA  No recent surgeries  No smoking   DDX to consider but not limited to: GERD, PE, ACS, infectious etiology  Plan: Will draw basic labs with Trop, D-dimer (if elevated check CTA chest), EKG, CXR , give Pepcid, ASA and reassess  Update: Trop and D-dimer negative. Feeling better s/p meds. Will DC with PCP and Cardio f/u. Strict ED return precautions discussed. Patient understands and agrees.

## 2023-09-19 NOTE — ED ADULT NURSE NOTE - OBJECTIVE STATEMENT
intake pt AOX 3 ambulatory to ER pt stated she is coming with some chest congestion with pins & needle feelings to left arm since yest. and has not subside as has previously happen in the past, + upper and lower extremities equal strength, when pt lift her left up c/o pain to left upper back area, denies SOB, no dizziness, no HA, labs sent, IV to right AC 20g angio place, medicated as ordered.      Zee Agarwal RN

## 2023-09-19 NOTE — ED PROVIDER NOTE - OBJECTIVE STATEMENT
33yr Female no pmhx presenting with right sided chest pain since last right, sharp/stabbing radiates to the upper right back and tingling in the left arm. Has SOB and hurts when breathing in. Right now it feels like a pressure/burning in her chest. The chest pain comes and go for the past 3 weeks, usually last a few minutes. Pt reports feeling nauseas, dizziness (off balance), headache. Denies recent travel, fever, URI, birth control, abdominal pain, calf pain, swellings. 34 y/o  Female no pmhx presenting with right sided chest pain since last right, sharp/stabbing radiates to the upper right back accompanied with SOB with inspiration, nausea, dizziness, HA and tingling in the arm. Describes it currently as a pressure/burning sensation in her chest that does come and go for the past 3 weeks, usually lasts a few minutes but lasting longer currently. Denies recent travel, fever, URI, OCP use, pregnancy (IUD), abdominal pain, n/v/d, calf pain/swelling. Denies family h/o blood clots. Had work up with PCP last week- no abnormal findings. Well appearing, NAD. VSS.  NKDA  No recent surgeries  No smoking

## 2023-09-19 NOTE — ED PROVIDER NOTE - NSFOLLOWUPINSTRUCTIONS_ED_ALL_ED_FT
Follow up with your PMD within 1-2 days.  Recommend follow up with a Cardiologist- you can call: Find a Physician helpline ([phone]) for assistance- show copies of your reports given to you.   Recommend Aspirin 81mg over the counter daily until further evaluation.    Take all of your other medications as previously prescribed.   Worsening, continued or new concerning symptoms return to the Emergency Department.     Chest Pain    WHAT YOU NEED TO KNOW:    Chest pain can be caused by a range of conditions, from not serious to life-threatening. Chest pain can be a symptom of a digestive problem, such as acid reflux or a stomach ulcer. An anxiety attack or a strong emotion, such as anger, can also cause chest pain. Infection, inflammation, or a fracture in the bones or cartilage in your chest can cause pain or discomfort. Sometimes chest pain or pressure is caused by poor blood flow to your heart (angina). Chest pain may also be caused by life-threatening conditions such as a heart attack or blood clot in your lungs.     DISCHARGE INSTRUCTIONS:    Call 911 if:     You have any of the following signs of a heart attack:   Squeezing, pressure, or pain in your chest      You may also have any of the following:   Discomfort or pain in your back, neck, jaw, stomach, or arm      Shortness of breath      Nausea or vomiting      Lightheadedness or a sudden cold sweat        Seek care immediately if:     You have chest discomfort that gets worse, even with medicine.      You cough or vomit blood.       Your bowel movements are black or bloody.       You cannot stop vomiting, or it hurts to swallow.     Contact your healthcare provider if:     You have questions or concerns about your condition or care.        Medicines:     Medicines may be given to treat the cause of your chest pain. Examples include pain medicine, anxiety medicine, or medicines to increase blood flow to your heart.       Do not take certain medicines without asking your healthcare provider first. These include NSAIDs, herbal or vitamin supplements, or hormones (estrogen or progestin).       Take your medicine as directed. Contact your healthcare provider if you think your medicine is not helping or if you have side effects. Tell him or her if you are allergic to any medicine. Keep a list of the medicines, vitamins, and herbs you take. Include the amounts, and when and why you take them. Bring the list or the pill bottles to follow-up visits. Carry your medicine list with you in case of an emergency.    Follow up with your healthcare provider within 72 hours, or as directed: You may need to return for more tests to find the cause of your chest pain. You may be referred to a specialist, such as a cardiologist or gastroenterologist. Write down your questions so you remember to ask them during your visits.     Healthy living tips: The following are general healthy guidelines. If your chest pain is caused by a heart problem, your healthcare provider will give you specific guidelines to follow.    Do not smoke. Nicotine and other chemicals in cigarettes and cigars can cause lung and heart damage. Ask your healthcare provider for information if you currently smoke and need help to quit. E-cigarettes or smokeless tobacco still contain nicotine. Talk to your healthcare provider before you use these products.       Eat a variety of healthy, low-fat, low-salt foods. Healthy foods include fruits, vegetables, whole-grain breads, low-fat dairy products, beans, lean meats, and fish. Ask for more information about a heart healthy diet.      Drink plenty of water every day. Your body is made of mostly water. Water helps your body to control temperature and blood pressure. Ask your healthcare provider how much water you should drink every day.      Ask about activity. Your healthcare provider will tell you which activities to limit or avoid. Ask when you can drive, return to work, and have sex. Ask about the best exercise plan for you.      Maintain a healthy weight. Ask your healthcare provider how much you should weigh. Ask him or her to help you create a weight loss plan if you are overweight.     If you have a stent:     Carry your stent card with you at all times.       Let all healthcare providers know that you have a stent.

## 2023-09-19 NOTE — ED PROVIDER NOTE - PATIENT PORTAL LINK FT
You can access the FollowMyHealth Patient Portal offered by [facility] by registering at the following website: [email or web address]. By joining [email or web address]’s FollowMyHealth portal, you will also be able to view your health information using other applications (apps) compatible with our system.

## 2023-09-19 NOTE — ED PROVIDER NOTE - CROS ED MUSC ALL NEG
Pt BIBA s/p trip and fall - pt with lac to forehead and c/o right shoulder pain - pt denies loc - - - -

## 2023-09-19 NOTE — ED PROVIDER NOTE - NS_EDPROVIDERDISPOUSERTYPE_ED_A_ED
I have personally evaluated and examined the patient. The Attending was available to me as a supervising provider if needed. I have personally evaluated and examined the patient. The Attending was available to me as a supervising provider if needed./Medical/PA/NP Students Attestation (For Medical/PA/NP Student USE Only)...

## 2024-02-24 ENCOUNTER — EMERGENCY (EMERGENCY)
Facility: HOSPITAL | Age: 34
LOS: 1 days | Discharge: HOME | End: 2024-02-24
Attending: STUDENT IN AN ORGANIZED HEALTH CARE EDUCATION/TRAINING PROGRAM | Admitting: STUDENT IN AN ORGANIZED HEALTH CARE EDUCATION/TRAINING PROGRAM
Payer: MEDICAID

## 2024-02-24 VITALS
OXYGEN SATURATION: 100 % | SYSTOLIC BLOOD PRESSURE: 106 MMHG | TEMPERATURE: 99 F | RESPIRATION RATE: 18 BRPM | HEART RATE: 72 BPM | DIASTOLIC BLOOD PRESSURE: 71 MMHG

## 2024-02-24 DIAGNOSIS — Z96.612 PRESENCE OF LEFT ARTIFICIAL SHOULDER JOINT: Chronic | ICD-10-CM

## 2024-02-24 PROCEDURE — 99284 EMERGENCY DEPT VISIT MOD MDM: CPT

## 2024-02-24 PROCEDURE — 93970 EXTREMITY STUDY: CPT | Mod: 26

## 2024-02-24 RX ADMIN — Medication 300 MILLIGRAM(S): at 10:34

## 2024-02-24 NOTE — ED ADULT NURSE REASSESSMENT NOTE - NS ED NURSE REASSESS COMMENT FT1
Received patient in room 25, awaiting US. Patient resting in stretcher, no distress noted. Patient is A&Ox4, airway patent, breathing unlabored and even, radial pulses palpable, abdomen soft, nontender, left leg swelling. Side rails up and safety maintained. Fall precaution in place. Call bells within reach.

## 2024-02-24 NOTE — ED PROVIDER NOTE - NSFOLLOWUPINSTRUCTIONS_ED_ALL_ED_FT
Follow up with your PMD within 48-72hours. Rest and elevate affected area. Take Clindamycin 300mg every six hours for seven days. Take Motrin 600mg every 8 hours with food for pain. Any worsening redness, swelling, streaking (red lines), fever, chills return to ER

## 2024-02-24 NOTE — ED ADULT NURSE NOTE - OBJECTIVE STATEMENT
pt received in room 25. pt is AAOX4 and ambulatory. pt presents to ED w/ left leg swelling and pain. pt reports she was seen at [facility] 1x week ago and was prescribed ABX w/ dx of cellulitis. pt reports pain and swelling persistent. pt has swelling from left foot to knee with redness to left side of knee with a small bruise. pt has warmth to leg. pt is able to ambulate. pt denies chest pain, SOB, N/V/D, dizziness and fever/chills. pt RR are even and unlabored. MD at bedside for eval. Safety maintained.

## 2024-02-24 NOTE — ED ADULT TRIAGE NOTE - CHIEF COMPLAINT QUOTE
c/o left leg swelling x 3 weeks. Was evaluated at [facility]'s dx with cellulitis and prescribed antibiotic a week ago, no DVT on US. Pt completed antibiotic however swelling and pain persists. Denies hx.

## 2024-02-24 NOTE — ED PROVIDER NOTE - OBJECTIVE STATEMENT
34 y/o F no PMH c/o pain, erythema, and swelling to her lle x 4 weeks, worsening. Pt states she was evaluated in another ED 1 week ago, had US lle that was negative for dvt, dx with cellulitis, completed course of keflex and notes erythema and pain has extended up her left thigh. Pt admits to travel to [facility] in December, states she had BBL sx there, stayed for approx 3-4 weeks and [place] back [place] known complications. Pt states pain worsens with she ambulates and swelling worsens at the end of the day after being dependent. Denies fever, chills, CP, SOB, abdominal pain, N/V, numbness/tingling/weakness, h/o DVT/PE, cough/hemoptysis. Pt has Mirena IUD, no other hormone use. No smoking hx.

## 2024-02-24 NOTE — ED PROVIDER NOTE - PATIENT PORTAL LINK FT
You can access the FollowMyHealth Patient Portal offered by [facility] by registering at the following website: [email or web address]. By joining [facility]’s FollowMyHealth portal, you will also be able to view your health information using other applications (apps) compatible with our system.

## 2024-02-24 NOTE — ED ADULT NURSE NOTE - CHIEF COMPLAINT QUOTE
c/o left leg swelling x 3 weeks. Was evaluated at [facility]'s dx with cellulitis and prescribed antibiotic a week ago, no DVT on US. Pt completed antibiotic however swelling and pain persists. Denies hx.

## 2024-02-24 NOTE — ED PROVIDER NOTE - SKIN LOCATION #1
LLE: medial aspect of knee extends proximally to medial mid thigh with small circular 1 cm old eccymosis and extends distally to posteriomedial calf. Mild ttp over medial aspect knee/thigh only, erythema is slightly warmer than areas without erythema however not hot to touch. ---- RLE has similar mild erythema and warmth to medial aspect of right knee/thigh that extends to midthigh also with small 1 cm circular old appearing ecchynmosis, no extension distally past knee, nontender to palpation, FORM of both rt/lt hips/knees/ankles without pain -- 2+ DP, PT b/l -- lle slghtly more swollen diffusely than rle

## 2024-02-24 NOTE — ED PROVIDER NOTE - PROGRESS NOTE DETAILS
no DVT, discussed results with pt, will tx for cellulitis however unable to r/o phlebitis complication from sx, will advise pt to f/u with pmd and if sxs do not improve in 48 to consider vascular f/u.

## 2024-02-24 NOTE — ED PROVIDER NOTE - GASTROINTESTINAL, MLM
Abdomen soft, non-tender, no guarding, horizontal lower abdominal incision, CDI, nontender to palpation without erythema, fluctuance, or drainage.

## 2024-02-24 NOTE — ED PROVIDER NOTE - CLINICAL SUMMARY MEDICAL DECISION MAKING FREE TEXT BOX
pt with mild erythema, pain and swelling to lle, rle with similar but more mild ssx; clinically suspect phlebitis vs mild cellulitis however give risk factors with r/o DVT; will order b/l duplex venous le and if negative will likely tx with clinda and give close f/u with pmd/ed if sxs do not show any improvement in 48 hours

## 2024-02-24 NOTE — ED PROVIDER NOTE - ATTENDING APP SHARED VISIT CONTRIBUTION OF CARE
I have evaluated the patient and agree with the documentation and assessment as made by the LINDA. We have discussed plan of care and work up for the patient.   This was a shared visit with the LINDA. I reviewed and verified the documentation and independently performed the documented:   History, Exam and Medical Decision Making.    33F presenting with LLE discoloration. For the one month, reports LLE discoloration. Went to OSH last week - DVT negative and started on course of keflex. No fevers/chills. Ambulating without any issues. No recent falls. Had Brazilian butt lift 2 months ago where they ?maybe suctioned fat out of her legs b/l. Physical: Afebrile, well appearing, neck supple, no rash, rrr, ctabl, abdomen soft and ndnt, no le edema, stable gait. FROM of both knees. Palp dp/pt pulses b/l.  Area of discoloration (not job erythema) over the medial aspect of L knee/thigh - not exquisitely ttp. No crepitus. Plan: duplex. Will demarcate lesion. Likely change ABX.

## 2024-04-21 NOTE — PROGRESS NOTE ADULT - ASSESSMENT
32y/o  PPD#1 from  c/b a PPH. +714. Most recent H+H 8.4/.3. Patient is currently stable and recovering well postpartum.    #PPH  Patient had a hemorrhage after arriving on the postpartum floor, with an additional QBL of 714.  - on methergine series  - s/p IM methergine, extra pit, cytotec DC, TXA  - s/p 1uPRBC, hematocrit yaritza appropriately  - 5a post-transfusion CBC 8.4/.3 up from 6.6/21.0  - Pt asymptomatic    #Postpartum state  - Continue with po analgesia  - Increase ambulation  - Continue regular diet  - IV lock    Nasreen Poon  PGY-1
ADMIT

## 2025-01-17 NOTE — OB RN PATIENT PROFILE - INFANT HOME WITH MOTHER, OB PROFILE
Quality 431: Preventive Care And Screening: Unhealthy Alcohol Use - Screening: Patient not identified as an unhealthy alcohol user when screened for unhealthy alcohol use using a systematic screening method Detail Level: Detailed Quality 226: Preventive Care And Screening: Tobacco Use: Screening And Cessation Intervention: Patient screened for tobacco use and is an ex/non-smoker Quality 47: Advance Care Plan: Advance Care Planning discussed and documented in the medical record; patient did not wish or was not able to name a surrogate decision maker or provide an advance care plan. yes

## 2025-03-18 NOTE — OB PROVIDER TRIAGE NOTE - LABOR: CERVICAL DILATION
fine motor/gait, locomotion, and balance/gross motor/muscle strength fine motor/gait, locomotion, and balance/gross motor/muscle strength 2-3.9 cm